# Patient Record
Sex: FEMALE | Race: WHITE | NOT HISPANIC OR LATINO | Employment: FULL TIME | URBAN - METROPOLITAN AREA
[De-identification: names, ages, dates, MRNs, and addresses within clinical notes are randomized per-mention and may not be internally consistent; named-entity substitution may affect disease eponyms.]

---

## 2017-10-11 ENCOUNTER — HOSPITAL ENCOUNTER (OUTPATIENT)
Dept: GENERAL RADIOLOGY | Facility: HOSPITAL | Age: 52
Discharge: HOME | End: 2017-10-11
Admitting: INTERNAL MEDICINE

## 2017-10-11 ENCOUNTER — TRANSCRIBE ORDERS (OUTPATIENT)
Dept: ADMINISTRATIVE | Facility: HOSPITAL | Age: 52
End: 2017-10-11

## 2017-10-11 DIAGNOSIS — J40 CATARRHAL BRONCHITIS: Primary | ICD-10-CM

## 2017-10-11 DIAGNOSIS — J40 CATARRHAL BRONCHITIS: ICD-10-CM

## 2017-10-11 PROCEDURE — 71020 HC CHEST PA AND LATERAL: CPT

## 2018-03-29 ENCOUNTER — TRANSCRIBE ORDERS (OUTPATIENT)
Dept: ADMINISTRATIVE | Facility: HOSPITAL | Age: 53
End: 2018-03-29

## 2018-03-29 ENCOUNTER — LAB (OUTPATIENT)
Dept: LAB | Facility: HOSPITAL | Age: 53
End: 2018-03-29

## 2018-03-29 DIAGNOSIS — E03.9 HYPOTHYROIDISM, UNSPECIFIED TYPE: ICD-10-CM

## 2018-03-29 DIAGNOSIS — D64.9 ANEMIA, UNSPECIFIED TYPE: ICD-10-CM

## 2018-03-29 DIAGNOSIS — D64.9 ANEMIA, UNSPECIFIED TYPE: Primary | ICD-10-CM

## 2018-03-29 LAB
ALBUMIN SERPL-MCNC: 4.4 G/DL (ref 3.5–5.2)
ALBUMIN/GLOB SERPL: 1.7 G/DL
ALP SERPL-CCNC: 52 U/L (ref 39–117)
ALT SERPL W P-5'-P-CCNC: 16 U/L (ref 1–33)
ANION GAP SERPL CALCULATED.3IONS-SCNC: 11.7 MMOL/L
AST SERPL-CCNC: 16 U/L (ref 1–32)
BASOPHILS # BLD AUTO: 0.04 10*3/MM3 (ref 0–0.2)
BASOPHILS NFR BLD AUTO: 0.6 % (ref 0–1.5)
BILIRUB SERPL-MCNC: 0.5 MG/DL (ref 0.1–1.2)
BUN BLD-MCNC: 9 MG/DL (ref 6–20)
BUN/CREAT SERPL: 12.2 (ref 7–25)
CALCIUM SPEC-SCNC: 9.5 MG/DL (ref 8.6–10.5)
CHLORIDE SERPL-SCNC: 99 MMOL/L (ref 98–107)
CO2 SERPL-SCNC: 27.3 MMOL/L (ref 22–29)
CREAT BLD-MCNC: 0.74 MG/DL (ref 0.57–1)
DEPRECATED RDW RBC AUTO: 42.2 FL (ref 37–54)
EOSINOPHIL # BLD AUTO: 0.11 10*3/MM3 (ref 0–0.7)
EOSINOPHIL NFR BLD AUTO: 1.6 % (ref 0.3–6.2)
ERYTHROCYTE [DISTWIDTH] IN BLOOD BY AUTOMATED COUNT: 12.6 % (ref 11.7–13)
FOLATE SERPL-MCNC: >20 NG/ML (ref 4.78–24.2)
GFR SERPL CREATININE-BSD FRML MDRD: 83 ML/MIN/1.73
GLOBULIN UR ELPH-MCNC: 2.6 GM/DL
GLUCOSE BLD-MCNC: 81 MG/DL (ref 65–99)
HCT VFR BLD AUTO: 42.7 % (ref 35.6–45.5)
HGB BLD-MCNC: 14.1 G/DL (ref 11.9–15.5)
IMM GRANULOCYTES # BLD: 0 10*3/MM3 (ref 0–0.03)
IMM GRANULOCYTES NFR BLD: 0 % (ref 0–0.5)
LYMPHOCYTES # BLD AUTO: 2.27 10*3/MM3 (ref 0.9–4.8)
LYMPHOCYTES NFR BLD AUTO: 32.7 % (ref 19.6–45.3)
MCH RBC QN AUTO: 30.5 PG (ref 26.9–32)
MCHC RBC AUTO-ENTMCNC: 33 G/DL (ref 32.4–36.3)
MCV RBC AUTO: 92.2 FL (ref 80.5–98.2)
MONOCYTES # BLD AUTO: 0.44 10*3/MM3 (ref 0.2–1.2)
MONOCYTES NFR BLD AUTO: 6.3 % (ref 5–12)
NEUTROPHILS # BLD AUTO: 4.08 10*3/MM3 (ref 1.9–8.1)
NEUTROPHILS NFR BLD AUTO: 58.8 % (ref 42.7–76)
PLATELET # BLD AUTO: 312 10*3/MM3 (ref 140–500)
PMV BLD AUTO: 9.6 FL (ref 6–12)
POTASSIUM BLD-SCNC: 4.3 MMOL/L (ref 3.5–5.2)
PROT SERPL-MCNC: 7 G/DL (ref 6–8.5)
RBC # BLD AUTO: 4.63 10*6/MM3 (ref 3.9–5.2)
SODIUM BLD-SCNC: 138 MMOL/L (ref 136–145)
T3 SERPL-MCNC: 98.6 NG/DL (ref 80–200)
T4 FREE SERPL-MCNC: 1.36 NG/DL (ref 0.93–1.7)
TSH SERPL DL<=0.05 MIU/L-ACNC: 1.01 MIU/ML (ref 0.27–4.2)
VIT B12 BLD-MCNC: 296 PG/ML (ref 211–946)
WBC NRBC COR # BLD: 6.94 10*3/MM3 (ref 4.5–10.7)

## 2018-03-29 PROCEDURE — 82746 ASSAY OF FOLIC ACID SERUM: CPT | Performed by: INTERNAL MEDICINE

## 2018-03-29 PROCEDURE — 36415 COLL VENOUS BLD VENIPUNCTURE: CPT

## 2018-03-29 PROCEDURE — 85025 COMPLETE CBC W/AUTO DIFF WBC: CPT | Performed by: INTERNAL MEDICINE

## 2018-03-29 PROCEDURE — 84443 ASSAY THYROID STIM HORMONE: CPT | Performed by: INTERNAL MEDICINE

## 2018-03-29 PROCEDURE — 84480 ASSAY TRIIODOTHYRONINE (T3): CPT | Performed by: INTERNAL MEDICINE

## 2018-03-29 PROCEDURE — 82607 VITAMIN B-12: CPT | Performed by: INTERNAL MEDICINE

## 2018-03-29 PROCEDURE — 84439 ASSAY OF FREE THYROXINE: CPT | Performed by: INTERNAL MEDICINE

## 2018-03-29 PROCEDURE — 80053 COMPREHEN METABOLIC PANEL: CPT | Performed by: INTERNAL MEDICINE

## 2018-08-27 ENCOUNTER — TRANSCRIBE ORDERS (OUTPATIENT)
Dept: ADMINISTRATIVE | Facility: HOSPITAL | Age: 53
End: 2018-08-27

## 2018-08-27 DIAGNOSIS — N02.9 IDIOPATHIC HEMATURIA, UNSPECIFIED WHETHER GLOMERULAR MORPHOLOGIC CHANGES PRESENT: Primary | ICD-10-CM

## 2018-08-27 DIAGNOSIS — R31.9 HEMATURIA, UNSPECIFIED TYPE: Primary | ICD-10-CM

## 2018-08-29 ENCOUNTER — HOSPITAL ENCOUNTER (OUTPATIENT)
Dept: CT IMAGING | Facility: HOSPITAL | Age: 53
Discharge: HOME | End: 2018-08-29
Admitting: INTERNAL MEDICINE

## 2018-08-29 DIAGNOSIS — N02.9 IDIOPATHIC HEMATURIA, UNSPECIFIED WHETHER GLOMERULAR MORPHOLOGIC CHANGES PRESENT: ICD-10-CM

## 2018-08-29 PROCEDURE — 74176 CT ABD & PELVIS W/O CONTRAST: CPT

## 2018-11-19 ENCOUNTER — APPOINTMENT (OUTPATIENT)
Dept: LAB | Facility: HOSPITAL | Age: 53
End: 2018-11-19

## 2018-11-19 ENCOUNTER — OFFICE VISIT (OUTPATIENT)
Dept: NEUROLOGY | Facility: CLINIC | Age: 53
End: 2018-11-19

## 2018-11-19 VITALS
SYSTOLIC BLOOD PRESSURE: 98 MMHG | HEART RATE: 100 BPM | WEIGHT: 110 LBS | OXYGEN SATURATION: 98 % | BODY MASS INDEX: 18.33 KG/M2 | HEIGHT: 65 IN | DIASTOLIC BLOOD PRESSURE: 72 MMHG

## 2018-11-19 DIAGNOSIS — R42 DIZZINESS: Primary | ICD-10-CM

## 2018-11-19 LAB
FOLATE SERPL-MCNC: >20 NG/ML (ref 4.78–24.2)
VIT B12 BLD-MCNC: 524 PG/ML (ref 211–946)

## 2018-11-19 PROCEDURE — 82607 VITAMIN B-12: CPT | Performed by: PSYCHIATRY & NEUROLOGY

## 2018-11-19 PROCEDURE — 36415 COLL VENOUS BLD VENIPUNCTURE: CPT | Performed by: PSYCHIATRY & NEUROLOGY

## 2018-11-19 PROCEDURE — 82746 ASSAY OF FOLIC ACID SERUM: CPT | Performed by: PSYCHIATRY & NEUROLOGY

## 2018-11-19 PROCEDURE — 99244 OFF/OP CNSLTJ NEW/EST MOD 40: CPT | Performed by: PSYCHIATRY & NEUROLOGY

## 2018-11-19 NOTE — PROGRESS NOTES
"Valeria Lemos is a 53 y.o. female presents for   Chief Complaint   Patient presents with   • Dizziness                CHIEF COMPLAINT:  Neurology consultation requested by Yg Victor MD for dizziness    History of Present Illness  A 53 FRED, right-handed,  with [employer] for 22 years.  She is referred with the chief complaint of dizziness  From the start she was not a very cooperative patient: she raised her voice when I suggested that she first answers the questions and then I would allow her all the time to tell her own story: she utterly refused and insisted on reading from a written statement. I asked if I could have her written history and she allowed me to make a copy for the record. I have outlined her written history for the record.  After she read her written statement she answered my questions but the answers were not for the most part  with any \"authority\" .   She told me she has applied for disability that she has neither workers' comp not legal pending.    Her story started with nausea and vomiting back in June 2017  AS could be ready in her written history she has had multiple visits to ED in multiple cities which she said disrupted flight schedules and at times they had [facility] had to make emergency landings to have ehr taken to ED.    After circling around with the history I read the notes from her PCP she then started answering questions  She said her major reason for seeing me was the \"dizziness\". I read in her PCP's notes that she has had some headaches but these are not of concern to her as \"they are far and few in-between\".    The dizziness is light-headedness without any vertigo. 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 She ahs no clearly associated neurological symptoms with the dizziness but thinks ehr memory is going downhill,.    She was seen by ENT and based on her description she ahd a " "full work up that was nor remarkable for any abnormalities; She then requested ENT refers her to neurology but declined to do so.      Basically her story is as follows for the dizziness:  Onset 1 month ago for no precipitating reason BUT she has been having manipulations by Chiropractor (see ehr written history)  It si off/on, movement related without vertigo tinnitus or hearing changes  During the episodes that last few minutes \"I have to stop and focus and look straight ahead\" without diplopia or visual changes . No oscillopsia  Frequency \"almost daily\" \"once or twice\"  Duration \"not too long few minutes\"  Triggers: she is fixated on any movement on the airplane....yet the onset of dizziness was one month ago and she last worked on September 18, 2018  Overall no progression since onset and no new related symptoms          The following portions of the patient's history were reviewed and updated as appropriate: allergies, current medications, past family history, past medical history, past social history, past surgical history and problem list.        Review of Systems   Constitutional: Positive for diaphoresis, fatigue and unexpected weight change.   HENT: Negative for hearing loss, tinnitus and trouble swallowing.    Eyes: Negative for pain, redness and itching.   Respiratory: Negative for cough, shortness of breath and wheezing.    Cardiovascular: Negative for chest pain, palpitations and leg swelling.   Gastrointestinal: Positive for nausea and vomiting. Negative for constipation and diarrhea.   Endocrine: Positive for polydipsia. Negative for cold intolerance and heat intolerance.   Genitourinary: Negative for difficulty urinating, frequency and urgency.   Musculoskeletal: Negative for back pain, gait problem, neck pain and neck stiffness.   Skin: Negative for color change and wound.   Allergic/Immunologic: Negative for environmental allergies, food allergies and immunocompromised state.   Neurological: " Positive for dizziness, weakness, light-headedness and headaches. Negative for tremors, seizures, syncope, facial asymmetry, speech difficulty and numbness.   Hematological: Negative for adenopathy. Does not bruise/bleed easily.   Psychiatric/Behavioral: Negative for agitation, behavioral problems, confusion, decreased concentration, dysphoric mood, hallucinations, self-injury, sleep disturbance and suicidal ideas. The patient is not nervous/anxious and is not hyperactive.          Past Medical History:   Diagnosis Date   • Arthritis          Social History     Socioeconomic History   • Marital status:      Spouse name: Not on file   • Number of children: Not on file   • Years of education: Not on file   • Highest education level: Not on file   Social Needs   • Financial resource strain: Not on file   • Food insecurity - worry: Not on file   • Food insecurity - inability: Not on file   • Transportation needs - medical: Not on file   • Transportation needs - non-medical: Not on file   Occupational History   • Not on file   Tobacco Use   • Smoking status: Never Smoker   • Smokeless tobacco: Never Used   Substance and Sexual Activity   • Alcohol use: Yes   • Drug use: No   • Sexual activity: Defer   Other Topics Concern   • Not on file   Social History Narrative   • Not on file          Family History   Problem Relation Age of Onset   • No Known Problems Mother    • Heart disease Father    • Kidney disease Father    • Cancer Brother    • No Known Problems Maternal Grandmother    • No Known Problems Maternal Grandfather    • Arthritis Paternal Grandmother    • No Known Problems Paternal Grandfather          No current outpatient medications on file.      Vitals:    11/19/18 1541   BP: 98/72   Pulse: 100   SpO2: 98%         Physical Exam   Constitutional: She is oriented to person, place, and time. She appears well-developed and well-nourished. She appears distressed.   HENT:   Head: Normocephalic.   Mouth/Throat:  Oropharynx is clear and moist.   Cardiovascular: Normal rate, regular rhythm and intact distal pulses.   Pulmonary/Chest: Effort normal. No respiratory distress.   Neurological: She is oriented to person, place, and time. She has normal strength. She has an abnormal Romberg Test. She has a normal Finger-Nose-Finger Test, a normal Heel to Seo Test and a normal Tandem Gait Test. Gait normal.   Reflex Scores:       Tricep reflexes are 2+ on the right side and 2+ on the left side.       Bicep reflexes are 2+ on the right side and 2+ on the left side.       Brachioradialis reflexes are 2+ on the right side and 2+ on the left side.       Patellar reflexes are 2+ on the right side and 2+ on the left side.       Achilles reflexes are 2+ on the right side and 2+ on the left side.  Skin: She is diaphoretic.   Psychiatric: Her speech is normal.   Argumentive     Clearly I could not trigger her dizziness despite asking her to move and change positions quickly during the exam    Neurologic Exam     Mental Status   Oriented to person, place, and time.   Registration: recalls 3 of 3 objects. Recall at 5 minutes: recalls 1 of 3 objects. Follows 3 step commands.   Attention: normal. Concentration: normal.   Speech: speech is normal   Level of consciousness: alert  Knowledge: consistent with education. Able to perform simple calculations.   Able to read. Able to write. Normal comprehension.     Cranial Nerves     CN II   Visual fields full to confrontation.     CN III, IV, VI   Right pupil: Size: 4 mm. Shape: regular. Reactivity: brisk. Consensual response: intact. Accommodation: intact.   Left pupil: Size: 4 mm. Shape: regular. Reactivity: brisk. Consensual response: intact. Accommodation: intact.   CN III: no CN III palsy  CN VI: no CN VI palsy  Nystagmus: none   Diplopia: none  Ophthalmoparesis: none  Upgaze: normal  Downgaze: normal  Conjugate gaze: present    CN V   Facial sensation intact.     CN VII   Facial expression full,  symmetric.     CN VIII   CN VIII normal.   Hearing impaired: normal to normal conversations.    CN IX, X   CN IX normal.     CN XI   CN XI normal.     CN XII   CN XII normal.     Motor Exam   Muscle bulk: normal  Overall muscle tone: normal  Right arm tone: normal  Left arm tone: normal  Right arm pronator drift: absent  Left arm pronator drift: absent  Right leg tone: normal  Left leg tone: normal    Strength   Strength 5/5 throughout.     Sensory Exam   Light touch normal.   Vibration normal.   Pinprick normal.   Graphesthesia: normal  Stereognosis: normal    Gait, Coordination, and Reflexes     Gait  Gait: normal    Coordination   Romberg: positive  Finger to nose coordination: normal  Heel to shin coordination: normal  Tandem walking coordination: normal    Tremor   Resting tremor: absent  Intention tremor: absent  Action tremor: absent    Reflexes   Right brachioradialis: 2+  Left brachioradialis: 2+  Right biceps: 2+  Left biceps: 2+  Right triceps: 2+  Left triceps: 2+  Right patellar: 2+  Left patellar: 2+  Right achilles: 2+  Left achilles: 2+  Right : 2+  Left : 2+  Right plantar: normal  Left plantar: normal  Right Velásquez: absent  Left Velásquez: absent  Right ankle clonus: absent  Left ankle clonus: absent  Right pendular knee jerk: absent  Left pendular knee jerk: absent          No visits with results within 2 Month(s) from this visit.   Latest known visit with results is:   Lab on 03/29/2018   Component Date Value Ref Range Status   • Glucose 03/29/2018 81  65 - 99 mg/dL Final   • BUN 03/29/2018 9  6 - 20 mg/dL Final   • Creatinine 03/29/2018 0.74  0.57 - 1.00 mg/dL Final   • Sodium 03/29/2018 138  136 - 145 mmol/L Final   • Potassium 03/29/2018 4.3  3.5 - 5.2 mmol/L Final   • Chloride 03/29/2018 99  98 - 107 mmol/L Final   • CO2 03/29/2018 27.3  22.0 - 29.0 mmol/L Final   • Calcium 03/29/2018 9.5  8.6 - 10.5 mg/dL Final   • Total Protein 03/29/2018 7.0  6.0 - 8.5 g/dL Final   • Albumin  03/29/2018 4.40  3.50 - 5.20 g/dL Final   • ALT (SGPT) 03/29/2018 16  1 - 33 U/L Final   • AST (SGOT) 03/29/2018 16  1 - 32 U/L Final   • Alkaline Phosphatase 03/29/2018 52  39 - 117 U/L Final   • Total Bilirubin 03/29/2018 0.5  0.1 - 1.2 mg/dL Final   • eGFR Non African Amer 03/29/2018 83  >60 mL/min/1.73 Final   • Globulin 03/29/2018 2.6  gm/dL Final   • A/G Ratio 03/29/2018 1.7  g/dL Final   • BUN/Creatinine Ratio 03/29/2018 12.2  7.0 - 25.0 Final   • Anion Gap 03/29/2018 11.7  mmol/L Final   • Vitamin B-12 03/29/2018 296  211 - 946 pg/mL Final   • Folate 03/29/2018 >20.00  4.78 - 24.20 ng/mL Final   • T3, Total 03/29/2018 98.6  80.0 - 200.0 ng/dl Final   • Free T4 03/29/2018 1.36  0.93 - 1.70 ng/dL Final   • TSH 03/29/2018 1.010  0.270 - 4.200 mIU/mL Final   • WBC 03/29/2018 6.94  4.50 - 10.70 10*3/mm3 Final   • RBC 03/29/2018 4.63  3.90 - 5.20 10*6/mm3 Final   • Hemoglobin 03/29/2018 14.1  11.9 - 15.5 g/dL Final   • Hematocrit 03/29/2018 42.7  35.6 - 45.5 % Final   • MCV 03/29/2018 92.2  80.5 - 98.2 fL Final   • MCH 03/29/2018 30.5  26.9 - 32.0 pg Final   • MCHC 03/29/2018 33.0  32.4 - 36.3 g/dL Final   • RDW 03/29/2018 12.6  11.7 - 13.0 % Final   • RDW-SD 03/29/2018 42.2  37.0 - 54.0 fl Final   • MPV 03/29/2018 9.6  6.0 - 12.0 fL Final   • Platelets 03/29/2018 312  140 - 500 10*3/mm3 Final   • Neutrophil % 03/29/2018 58.8  42.7 - 76.0 % Final   • Lymphocyte % 03/29/2018 32.7  19.6 - 45.3 % Final   • Monocyte % 03/29/2018 6.3  5.0 - 12.0 % Final   • Eosinophil % 03/29/2018 1.6  0.3 - 6.2 % Final   • Basophil % 03/29/2018 0.6  0.0 - 1.5 % Final   • Immature Grans % 03/29/2018 0.0  0.0 - 0.5 % Final   • Neutrophils, Absolute 03/29/2018 4.08  1.90 - 8.10 10*3/mm3 Final   • Lymphocytes, Absolute 03/29/2018 2.27  0.90 - 4.80 10*3/mm3 Final   • Monocytes, Absolute 03/29/2018 0.44  0.20 - 1.20 10*3/mm3 Final   • Eosinophils, Absolute 03/29/2018 0.11  0.00 - 0.70 10*3/mm3 Final   • Basophils, Absolute 03/29/2018 0.04   0.00 - 0.20 10*3/mm3 Final   • Immature Grans, Absolute 03/29/2018 0.00  0.00 - 0.03 10*3/mm3 Final            REVIEW OF STUDIES:      DIAGNOSIS, IMPRESSION AND PLAN:  Nonscript dizziness with functional positive Romberg   I could not tell from her story if the dizziness onset had time relationship to chiropractor manipulation.  She does not think the headaches are of any concern and basically brushed off any details about them.  Her recall is of questionable reliability but I will investigate with   B12/folate and brain MRI.  I doubt she has developed vertebral artery dissection duhring chiropractor  I reviewed DDx and the need for the workup.

## 2018-12-03 ENCOUNTER — HOSPITAL ENCOUNTER (OUTPATIENT)
Dept: MRI IMAGING | Facility: HOSPITAL | Age: 53
Discharge: HOME | End: 2018-12-03
Attending: PSYCHIATRY & NEUROLOGY

## 2018-12-03 DIAGNOSIS — R42 DIZZINESS: ICD-10-CM

## 2018-12-03 PROCEDURE — 82565 ASSAY OF CREATININE: CPT

## 2018-12-04 ENCOUNTER — TELEPHONE (OUTPATIENT)
Dept: NEUROLOGY | Facility: CLINIC | Age: 53
End: 2018-12-04

## 2018-12-04 LAB — CREAT BLDA-MCNC: 0.8 MG/DL (ref 0.6–1.3)

## 2018-12-04 NOTE — TELEPHONE ENCOUNTER
----- Message from Sujatha Clifton sent at 12/4/2018 11:38 AM EST -----  Contact: Patient  Pt was unable to have MRI yesterday due to claustrophobia.  She would like to have a new order put in and prescribe Valium for her nerves.      Send RX to [facility].     Call pt to let her know rx has been sent.

## 2018-12-05 DIAGNOSIS — R42 DIZZINESS: Primary | ICD-10-CM

## 2018-12-05 DIAGNOSIS — R42 DIZZINESSES: ICD-10-CM

## 2018-12-06 PROBLEM — R42 DIZZINESS: Status: ACTIVE | Noted: 2018-12-06

## 2018-12-19 ENCOUNTER — OFFICE VISIT (OUTPATIENT)
Dept: NEUROLOGY | Facility: CLINIC | Age: 53
End: 2018-12-19

## 2018-12-19 VITALS
OXYGEN SATURATION: 97 % | HEIGHT: 65 IN | HEART RATE: 68 BPM | SYSTOLIC BLOOD PRESSURE: 118 MMHG | WEIGHT: 113 LBS | DIASTOLIC BLOOD PRESSURE: 68 MMHG | BODY MASS INDEX: 18.83 KG/M2

## 2018-12-19 DIAGNOSIS — R42 DIZZINESS: Primary | ICD-10-CM

## 2018-12-19 PROCEDURE — 99213 OFFICE O/P EST LOW 20 MIN: CPT | Performed by: PSYCHIATRY & NEUROLOGY

## 2018-12-19 RX ORDER — ESTRADIOL/NORETHINDRONE ACETATE TRANSDERMAL SYSTEM .05; .14 MG/D; MG/D
PATCH, EXTENDED RELEASE TRANSDERMAL SEE ADMIN INSTRUCTIONS
Refills: 3 | COMMUNITY
Start: 2018-12-12 | End: 2019-10-25

## 2018-12-19 RX ORDER — OMEPRAZOLE 40 MG/1
CAPSULE, DELAYED RELEASE ORAL
COMMUNITY
Start: 2018-12-01 | End: 2019-02-27

## 2018-12-19 NOTE — PROGRESS NOTES
"Follow Up Visit:  Valeria is seen in follow up today regarding her dizziness and headcahes  At initial consultation her major concern as a  was her dizziness but she was not concerend about the headcahes    Interim review she was seen by Gyn and was satrted on hormonal therapy and her sympotms have essentially resolved  NO new complaints            Vitals:    12/19/18 1147   BP: 118/68   Pulse: 68   SpO2: 97%   Weight: 51.3 kg (113 lb)   Height: 165.1 cm (65\")       Outside MRI IAC was done at [facility]  WE have the repot but not the CD: she chose to go there althopugh I gave her sedation so she could have it done here at [facility]  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 I showed her using colorful anatomical diagrams where the telangiectasia is        IMPRESSION / PLAN  Symptoms of headaches and dizziness resolved on compipatch (hormonal)      "

## 2019-02-15 ENCOUNTER — TELEPHONE (OUTPATIENT)
Dept: FAMILY MEDICINE CLINIC | Facility: CLINIC | Age: 54
End: 2019-02-15

## 2019-02-15 NOTE — TELEPHONE ENCOUNTER
LEFT DETAILED MESSAGE     ----- Message from Mello Briceño MD sent at 2/15/2019  3:08 PM EST -----    Agree to see patient    ----- Message -----  From: Sonya Cifuentes  Sent: 2/15/2019   9:35 AM  To: Mello Briceño MD    Spouse of aki martinez, current patient     She would like to be your patient,  experiencing ongoing nausea and diahrea when flying. Do you want to see her as a patient?     [phone]

## 2019-02-27 ENCOUNTER — TELEPHONE (OUTPATIENT)
Dept: FAMILY MEDICINE CLINIC | Facility: CLINIC | Age: 54
End: 2019-02-27

## 2019-02-27 ENCOUNTER — OFFICE VISIT (OUTPATIENT)
Dept: FAMILY MEDICINE CLINIC | Facility: CLINIC | Age: 54
End: 2019-02-27

## 2019-02-27 VITALS
TEMPERATURE: 98.1 F | SYSTOLIC BLOOD PRESSURE: 104 MMHG | BODY MASS INDEX: 18.73 KG/M2 | HEIGHT: 65 IN | OXYGEN SATURATION: 99 % | WEIGHT: 112.4 LBS | HEART RATE: 74 BPM | DIASTOLIC BLOOD PRESSURE: 70 MMHG

## 2019-02-27 DIAGNOSIS — R53.83 FATIGUE, UNSPECIFIED TYPE: ICD-10-CM

## 2019-02-27 DIAGNOSIS — R11.2 NON-INTRACTABLE VOMITING WITH NAUSEA, UNSPECIFIED VOMITING TYPE: Primary | ICD-10-CM

## 2019-02-27 PROCEDURE — 99203 OFFICE O/P NEW LOW 30 MIN: CPT | Performed by: INTERNAL MEDICINE

## 2019-02-27 NOTE — TELEPHONE ENCOUNTER
LEFT DETAILED MESSAGE FOR PATIENT ON VM     ----- Message from Domingo Flores MD sent at 2/27/2019  2:54 PM EST -----    I said I wanted to see what the lab work looked like first    ----- Message -----    From: Sonya Cifuentes  Sent: 2/27/2019   2:16 PM  To: Domingo Flores MD    You had discussed calling in a mild anti-depressant for the patient during her appt today, nothing noted in the med section. Please send to [facility] in chart

## 2019-02-27 NOTE — PROGRESS NOTES
Subjective Complaint is to establish care but also nausea and vomiting and fatigue  Valeria Lemos is a 53 y.o. female.     History of Present Illness   Valeria is here today to establish care.  She has had the onset of intermittent nausea and vomiting beginning in 2017 after a plane flight.  Initially it was thought that she had some sort of food poisoning.  Since that time she has had numerous other episodes of this.  She works as a .  This usually seems to occur either during a flight or sometimes 12 hours after a flight.  Has seen an ear nose and throat doctor.  Her electronystagmogram was basically okay.  Her Cameron-Hallpike maneuvers did not show any evidence of vertigo.  She has seen a neurologist.  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  He has seen a gastroenterologist.  She has had a normal EGD and colonoscopy.  Ultrasound of the gallbladder and HIDA scan were normal.  Her CT scan of the abdomen and pelvis looked okay.  She is under some stress dealing with a 14-year-old daughter.  She has not been able to work since September.  She has had numerous flights have to be cut short because of her symptoms.  The following portions of the patient's history were reviewed and updated as appropriate: allergies, current medications, past medical history, past social history, past surgical history and problem list.    Review of Systems   Constitutional: Positive for fatigue. Negative for unexpected weight gain and unexpected weight loss.   Gastrointestinal: Positive for nausea and vomiting.   Neurological: Positive for dizziness.   Psychiatric/Behavioral: Positive for dysphoric mood and sleep disturbance.       Objective   Physical Exam   Constitutional: She is oriented to person, place, and time. She appears well-developed and well-nourished.   HENT:   Tympanic membranes are normal.  There is no significant  nasal congestion.  Rhinorrhea is clear.  Oropharynx is clear and the palate elevates symmetrically.   Eyes: Conjunctivae are normal.   Neck: No thyromegaly present.   Cardiovascular: Normal rate, regular rhythm, normal heart sounds and intact distal pulses. Exam reveals no gallop and no friction rub.   No murmur heard.  Pulmonary/Chest: Effort normal and breath sounds normal. No stridor. No respiratory distress. She has no wheezes. She has no rales.   Abdominal: Soft. Bowel sounds are normal. She exhibits no distension and no mass. There is no tenderness. There is no guarding.   Musculoskeletal: She exhibits no edema.   Lymphadenopathy:     She has no cervical adenopathy.   Neurological: She is alert and oriented to person, place, and time. No cranial nerve deficit.   Skin: Skin is warm and dry.   Psychiatric: She has a normal mood and affect.   Nursing note and vitals reviewed.        Assessment/Plan   Valeria was seen today for establish care, vomiting and nausea.    Diagnoses and all orders for this visit:    Non-intractable vomiting with nausea, unspecified vomiting type  -     TSH+Free T4  -     T3, Free  -     Cortisol  -     Sedimentation Rate  -     C-reactive Protein  -     KARLY Comprehensive Panel  -     Rheumatoid Factor  -     Charlene-Barr Virus VCA Antibody Panel    Fatigue, unspecified type  -     TSH+Free T4  -     T3, Free  -     Cortisol  -     Sedimentation Rate  -     C-reactive Protein  -     KARLY Comprehensive Panel  -     Rheumatoid Factor  -     Charlene-Barr Virus VCA Antibody Panel    Valeria is here today for establishment of care.  I am going to check some laboratories for her fatigue.  I do not think I will be able to solve the problem of nausea and vomiting.  It may be something related to altitude or cabin pressure sickness.  She did fill out a depression questionnaire and scored quite high on this.  If her lab work today comes back normal we may consider low-dose Remeron.

## 2019-02-28 DIAGNOSIS — R42 DIZZINESS: Primary | ICD-10-CM

## 2019-02-28 LAB
CENTROMERE B AB SER-ACNC: <0.2 AI (ref 0–0.9)
CHROMATIN AB SERPL-ACNC: <0.2 AI (ref 0–0.9)
CORTIS SERPL-MCNC: 8 UG/DL
CRP SERPL-MCNC: 0.07 MG/DL (ref 0–0.5)
DSDNA AB SER-ACNC: <1 IU/ML (ref 0–9)
EBV EA IGG SER-ACNC: 31.4 U/ML (ref 0–8.9)
EBV NA IGG SER IA-ACNC: 421 U/ML (ref 0–17.9)
EBV VCA IGG SER IA-ACNC: >600 U/ML (ref 0–17.9)
EBV VCA IGM SER IA-ACNC: <36 U/ML (ref 0–35.9)
ENA JO1 AB SER-ACNC: <0.2 AI (ref 0–0.9)
ENA RNP AB SER-ACNC: 0.3 AI (ref 0–0.9)
ENA SCL70 AB SER-ACNC: <0.2 AI (ref 0–0.9)
ENA SM AB SER-ACNC: <0.2 AI (ref 0–0.9)
ENA SS-A AB SER-ACNC: <0.2 AI (ref 0–0.9)
ENA SS-B AB SER-ACNC: <0.2 AI (ref 0–0.9)
ERYTHROCYTE [SEDIMENTATION RATE] IN BLOOD BY WESTERGREN METHOD: 4 MM/HR (ref 0–30)
Lab: NORMAL
RHEUMATOID FACT SERPL-ACNC: <10 IU/ML (ref 0–13.9)
SERVICE CMNT-IMP: ABNORMAL
T3FREE SERPL-MCNC: 3.3 PG/ML (ref 2–4.4)
T4 FREE SERPL-MCNC: 1.17 NG/DL (ref 0.93–1.7)
TSH SERPL DL<=0.005 MIU/L-ACNC: 2.16 MIU/ML (ref 0.27–4.2)

## 2019-02-28 RX ORDER — MIRTAZAPINE 7.5 MG/1
7.5 TABLET, FILM COATED ORAL NIGHTLY
Qty: 30 TABLET | Refills: 2 | Status: SHIPPED | OUTPATIENT
Start: 2019-02-28 | End: 2019-03-25 | Stop reason: SDUPTHER

## 2019-03-01 ENCOUNTER — TELEPHONE (OUTPATIENT)
Dept: FAMILY MEDICINE CLINIC | Facility: CLINIC | Age: 54
End: 2019-03-01

## 2019-03-01 NOTE — TELEPHONE ENCOUNTER
Spoke to Dr Victor office and they need a signed release form.  Called Valeria and she will stop in next time she is in the area to get one so we can fax it over.  Phone [phone]    ----- Message from Domingo Flores MD sent at 2/27/2019 10:24 AM EST -----  Please contact dr gloria victor for records

## 2019-03-25 RX ORDER — MIRTAZAPINE 7.5 MG/1
7.5 TABLET, FILM COATED ORAL NIGHTLY
Qty: 30 TABLET | Refills: 2 | Status: SHIPPED | OUTPATIENT
Start: 2019-03-25 | End: 2021-07-08

## 2019-10-25 ENCOUNTER — OFFICE VISIT (OUTPATIENT)
Dept: FAMILY MEDICINE CLINIC | Facility: CLINIC | Age: 54
End: 2019-10-25

## 2019-10-25 VITALS
SYSTOLIC BLOOD PRESSURE: 120 MMHG | BODY MASS INDEX: 18.09 KG/M2 | HEART RATE: 77 BPM | WEIGHT: 108.6 LBS | TEMPERATURE: 97.8 F | OXYGEN SATURATION: 99 % | DIASTOLIC BLOOD PRESSURE: 82 MMHG | HEIGHT: 65 IN

## 2019-10-25 DIAGNOSIS — G24.01 TARDIVE DYSKINESIA: Primary | ICD-10-CM

## 2019-10-25 DIAGNOSIS — Z23 NEED FOR IMMUNIZATION AGAINST INFLUENZA: ICD-10-CM

## 2019-10-25 PROCEDURE — 90471 IMMUNIZATION ADMIN: CPT | Performed by: INTERNAL MEDICINE

## 2019-10-25 PROCEDURE — 90674 CCIIV4 VAC NO PRSV 0.5 ML IM: CPT | Performed by: INTERNAL MEDICINE

## 2019-10-25 PROCEDURE — 99213 OFFICE O/P EST LOW 20 MIN: CPT | Performed by: INTERNAL MEDICINE

## 2019-10-25 RX ORDER — ESTRADIOL AND LEVONORGESTREL .045; .015 MG/D; MG/D
PATCH TRANSDERMAL
Refills: 2 | COMMUNITY
Start: 2019-10-11 | End: 2021-10-08

## 2019-10-25 NOTE — PROGRESS NOTES
Subjective Pain is twitching  Valeria Lemos is a 54 y.o. female.     History of Present Illness   Valeria is here today for twitching.  This is been going on for several months.  She reports that it is in her jaw and neck and left arm.  Is gotten worse over the last several months.  She was treated with multiple medicines for nausea in the past.  I suspect 1 of these may be the underlying cause.  I did review a prior MRI scan [facility].  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      Review of Systems   HENT: Positive for ear pain.        Objective   Physical Exam   Constitutional: She is oriented to person, place, and time. She appears well-developed and well-nourished.   Cardiovascular: Normal rate.   Pulmonary/Chest: Effort normal.   Neurological: She is alert and oriented to person, place, and time. No cranial nerve deficit.   She has some facial grimacing.  She has involuntary movements in her left trapezius.  There are no fasciculations of the tongue.   Nursing note and vitals reviewed.        Assessment/Plan   Valeria was seen today for follow-up.    Diagnoses and all orders for this visit:    Tardive dyskinesia  -     Ambulatory Referral to Neurology      Valeria is here today for evaluation of abnormal movements which I believe to be a tar dive dyskinesia.  I am going to refer her to the [facility] movement disorder clinic.

## 2020-02-10 ENCOUNTER — OFFICE VISIT (OUTPATIENT)
Dept: NEUROLOGY | Facility: CLINIC | Age: 55
End: 2020-02-10

## 2020-02-10 VITALS
WEIGHT: 109 LBS | HEART RATE: 76 BPM | BODY MASS INDEX: 18.16 KG/M2 | HEIGHT: 65 IN | OXYGEN SATURATION: 98 % | SYSTOLIC BLOOD PRESSURE: 110 MMHG | DIASTOLIC BLOOD PRESSURE: 72 MMHG

## 2020-02-10 DIAGNOSIS — G24.01 TARDIVE DYSKINESIA: Primary | ICD-10-CM

## 2020-02-10 PROCEDURE — 99214 OFFICE O/P EST MOD 30 MIN: CPT | Performed by: PSYCHIATRY & NEUROLOGY

## 2020-02-10 NOTE — PROGRESS NOTES
Chief Complaint   Patient presents with   • Tardive Dyskinesia       Patient ID: Valeria Lemos is a 54 y.o. female.    HPI: I had the pleasure of seeing your patient today.  As you may know Valeria is a 54-year-old female who had been seen previously by my former colleague here at [facility] neurology.  A chart review was performed today seeing how the patient is new to me.  This is my first time seeing her.  She was being seen previously for dizziness.  Her symptoms of dizziness have essentially resolved and she has been doing better however she states that she has a new symptom.  She describes it as movements of her mouth particularly the jaw and tongue.  She states that she will have involuntary movements of her left upper extremity.  The patient has a lot of GI issues and has been treated with several medications off and on for this.  She says that she was told that 1 of the medications has caused these presenting symptoms as a side effect.  She is currently not on any medications for the gastritis.  She cannot recall the medications she had been given however she says that she has been to the emergency room several times and was given several doses of anti-nausea medication.  She was also taking Zofran for quite some time as needed.  She states that her current symptoms come and go in severity.  She has noted that when she is fatigued or tired her symptoms may be more prominent.  She denies any significant quality of life changes related to these symptoms.  She has not had any focal weakness or numbness.  No double vision.  No slurred speech or trouble swallowing.  No balance or gait changes.    The following portions of the patient's history were reviewed and updated as appropriate: allergies, current medications, past family history, past medical history, past social history, past surgical history and problem list.    Review of Systems   Constitutional: Negative for activity change, appetite change and  fatigue.   HENT: Negative for facial swelling, hearing loss and tinnitus.    Eyes: Negative for photophobia, redness and visual disturbance.   Respiratory: Negative for chest tightness, shortness of breath and wheezing.    Cardiovascular: Negative for chest pain, palpitations and leg swelling.   Gastrointestinal: Negative for abdominal pain, nausea and vomiting.   Endocrine: Negative for cold intolerance, heat intolerance and polydipsia.   Musculoskeletal: Negative for back pain, gait problem and neck pain.   Skin: Negative for color change, rash and wound.   Allergic/Immunologic: Negative for environmental allergies, food allergies and immunocompromised state.   Neurological: Negative for dizziness, tremors, seizures, syncope, facial asymmetry, speech difficulty, weakness, light-headedness, numbness and headaches.   Hematological: Negative for adenopathy. Does not bruise/bleed easily.   Psychiatric/Behavioral: Negative for agitation, behavioral problems, confusion, decreased concentration, dysphoric mood, hallucinations, self-injury, sleep disturbance and suicidal ideas. The patient is not nervous/anxious and is not hyperactive.       I reviewed and agree with the above review of systems completed by the medical assistant.    Vitals:    02/10/20 0812   Pulse: 76   SpO2: 98%       Neurologic Exam     Mental Status   Oriented to person, place, and time.   Concentration: normal.   Level of consciousness: alert  Knowledge: consistent with education (No deficits found.).     Cranial Nerves     CN II   Visual fields full to confrontation.     CN III, IV, VI   Pupils are equal, round, and reactive to light.  Extraocular motions are normal.   CN III: no CN III palsy  CN VI: no CN VI palsy    CN V   Facial sensation intact.     CN VII   Facial expression full, symmetric.     CN VIII   CN VIII normal.     CN IX, X   CN IX normal.   CN X normal.     CN XI   CN XI normal.     CN XII   CN XII normal.     Motor Exam     Strength    Right neck flexion: 5/5  Left neck flexion: 5/5  Right neck extension: 5/5  Left neck extension: 5/5  Right deltoid: 5/5  Left deltoid: 5/5  Right biceps: 5/5  Left biceps: 5/5  Right triceps: 5/5  Left triceps: 5/5  Right wrist flexion: 5/5  Left wrist flexion: 5/5  Right wrist extension: 5/5  Left wrist extension: 5/5  Right interossei: 5/5  Left interossei: 5/5  Right abdominals: 5/5  Left abdominals: 5/5  Right iliopsoas: 5/5  Left iliopsoas: 5/5  Right quadriceps: 5/5  Left quadriceps: 5/5  Right hamstrin/5  Left hamstrin/5  Right glutei: 5/5  Left glutei: 5/5  Right anterior tibial: 5/5  Left anterior tibial: 5/5  Right posterior tibial: 5/5  Left posterior tibial: 5/5  Right peroneal: 5/5  Left peroneal: 5/5  Right gastroc: 5/5  Left gastroc: 5/5Occasional twitching of the left shoulder and jaw.  Particularly the corners of the mouth more prominent on the left.     Sensory Exam   Light touch normal.   Vibration normal.     Gait, Coordination, and Reflexes     Gait  Gait: normal    Reflexes   Right brachioradialis: 2+  Left brachioradialis: 2+  Right biceps: 2+  Left biceps: 2+  Right triceps: 2+  Left triceps: 2+  Right patellar: 2+  Left patellar: 2+  Right achilles: 2+  Left achilles: 2+  Right : 2+  Left : 2+Station is normal.       Physical Exam   Constitutional: She is oriented to person, place, and time. She appears well-developed and well-nourished.   HENT:   Head: Normocephalic and atraumatic.   Eyes: Pupils are equal, round, and reactive to light. EOM are normal.   Cardiovascular: Normal rate and regular rhythm.   Pulmonary/Chest: Breath sounds normal.   Musculoskeletal: Normal range of motion.   Neurological: She is oriented to person, place, and time. Gait normal.   Reflex Scores:       Tricep reflexes are 2+ on the right side and 2+ on the left side.       Bicep reflexes are 2+ on the right side and 2+ on the left side.       Brachioradialis reflexes are 2+ on the right side and  2+ on the left side.       Patellar reflexes are 2+ on the right side and 2+ on the left side.       Achilles reflexes are 2+ on the right side and 2+ on the left side.  Skin: Skin is warm.   Vitals reviewed.      Procedures    Assessment/Plan: I feel that she does have tardive dyskinesia.  Zofran can cause extrapyramidal symptoms as an adverse reaction.  Also Reglan has been found to cause extrapyramidal symptoms and parkinsonism.  I do not appreciate any parkinsonism on examination today however I do feel that she does have the tardive dyskinesia.  We discussed medical options.  She is quite hesitant to take medication in general.  She acknowledges that her symptoms have not greatly affected her quality of life.  We will follow her over the long-term and see her back in 6 months to follow-up on her symptoms.  A total of 15 minutes was spent face-to-face with the patient today.  Of that greater than 50% of this time was spent discussing signs and symptoms of tardive dyskinesia, patient education, plan of care and prognosis.       Valeria was seen today for tardive dyskinesia.    Diagnoses and all orders for this visit:    Tardive dyskinesia           Darian Coronado II, MD

## 2020-02-19 ENCOUNTER — OFFICE VISIT (OUTPATIENT)
Dept: FAMILY MEDICINE CLINIC | Facility: CLINIC | Age: 55
End: 2020-02-19

## 2020-02-19 VITALS
HEIGHT: 65 IN | OXYGEN SATURATION: 98 % | SYSTOLIC BLOOD PRESSURE: 130 MMHG | BODY MASS INDEX: 18.43 KG/M2 | WEIGHT: 110.6 LBS | TEMPERATURE: 98.9 F | DIASTOLIC BLOOD PRESSURE: 60 MMHG | HEART RATE: 89 BPM

## 2020-02-19 DIAGNOSIS — J40 BRONCHITIS: Primary | ICD-10-CM

## 2020-02-19 DIAGNOSIS — J06.9 URI WITH COUGH AND CONGESTION: ICD-10-CM

## 2020-02-19 PROCEDURE — 99214 OFFICE O/P EST MOD 30 MIN: CPT | Performed by: INTERNAL MEDICINE

## 2020-02-19 RX ORDER — DOXYCYCLINE HYCLATE 100 MG/1
100 CAPSULE ORAL DAILY
Qty: 7 CAPSULE | Refills: 0 | Status: SHIPPED | OUTPATIENT
Start: 2020-02-19 | End: 2021-07-08

## 2020-02-19 RX ORDER — BENZONATATE 100 MG/1
100 CAPSULE ORAL 3 TIMES DAILY
Qty: 30 CAPSULE | Refills: 0 | Status: SHIPPED | OUTPATIENT
Start: 2020-02-19 | End: 2021-07-08

## 2020-02-19 RX ORDER — BROMPHENIRAMINE MALEATE, PSEUDOEPHEDRINE HYDROCHLORIDE, AND DEXTROMETHORPHAN HYDROBROMIDE 2; 30; 10 MG/5ML; MG/5ML; MG/5ML
5 SYRUP ORAL 4 TIMES DAILY PRN
Qty: 118 ML | Refills: 1 | Status: SHIPPED | OUTPATIENT
Start: 2020-02-19 | End: 2021-07-08

## 2020-02-19 NOTE — PROGRESS NOTES
Subjective Chief complaint is nasal congestion and cough  Valeria Lemos is a 54 y.o. female.     History of Present Illness   Valeria is here today for complaints of moderate nasal congestion and cough.  This began actually 9 days ago more is a tickle in her throat.  She then developed congestion and cough.  She has not had fever.  She has not had chills.  She does not feel any more short of breath than usual.  She works as a .  She has tried over-the-counter DayQuil and NyQuil.  She is tried over-the-counter Mucinex.  She has tried Tylenol cold preparation.  None of this has seemed to help.    The following portions of the patient's history were reviewed and updated as appropriate: allergies, current medications, past family history, past medical history, past social history, past surgical history and problem list.    Review of Systems   Constitutional: Negative for chills and fever.   HENT: Positive for congestion, postnasal drip, rhinorrhea and sore throat.    Respiratory: Positive for cough. Negative for shortness of breath.        Objective   Physical Exam   Constitutional: She appears well-developed and well-nourished.   HENT:   Head: Normocephalic and atraumatic.   Tympanic membranes are normal.  There is bilateral nasal congestion but rhinorrhea is clear.  There is some posterior pharyngeal erythema but no exudate   Cardiovascular: Normal rate, regular rhythm and normal heart sounds.   Pulmonary/Chest: Effort normal. She has no wheezes.   She does have some upper airway rhonchi   Lymphadenopathy:     She has no cervical adenopathy.   Nursing note and vitals reviewed.        Assessment/Plan   Valeria was seen today for nasal congestion, cough and uri.    Diagnoses and all orders for this visit:    Bronchitis    URI with cough and congestion    Other orders  -     brompheniramine-pseudoephedrine-DM 30-2-10 MG/5ML syrup; Take 5 mL by mouth 4 (Four) Times a Day As Needed for Congestion or Cough.  -      benzonatate (TESSALON) 100 MG capsule; Take 1 capsule by mouth 3 (Three) Times a Day.  -     doxycycline (VIBRAMYCIN) 100 MG capsule; Take 1 capsule by mouth Daily.    Valeria is here today for respiratory symptoms.  I believe she has a bronchitis and I am going to use some doxycycline.  She is going to take Tessalon Perles routinely and use the Bromfed as needed.  I am going to keep her off work.  She may return to work on the 24th.

## 2020-12-04 ENCOUNTER — OFFICE VISIT (OUTPATIENT)
Dept: NEUROLOGY | Facility: CLINIC | Age: 55
End: 2020-12-04

## 2020-12-04 VITALS
DIASTOLIC BLOOD PRESSURE: 72 MMHG | HEART RATE: 88 BPM | OXYGEN SATURATION: 99 % | BODY MASS INDEX: 18.99 KG/M2 | HEIGHT: 65 IN | SYSTOLIC BLOOD PRESSURE: 114 MMHG | WEIGHT: 114 LBS

## 2020-12-04 DIAGNOSIS — G24.01 TARDIVE DYSKINESIA: Primary | ICD-10-CM

## 2020-12-04 PROCEDURE — 99213 OFFICE O/P EST LOW 20 MIN: CPT | Performed by: PSYCHIATRY & NEUROLOGY

## 2020-12-04 RX ORDER — LORATADINE 10 MG/1
10 TABLET ORAL DAILY
COMMUNITY

## 2020-12-04 RX ORDER — LANOLIN ALCOHOL/MO/W.PET/CERES
1000 CREAM (GRAM) TOPICAL DAILY
COMMUNITY

## 2020-12-04 RX ORDER — VITAMIN B COMPLEX
1 CAPSULE ORAL DAILY
COMMUNITY

## 2020-12-04 RX ORDER — UBIDECARENONE 100 MG
1000 CAPSULE ORAL DAILY
COMMUNITY

## 2020-12-04 RX ORDER — MULTIPLE VITAMINS W/ MINERALS TAB 9MG-400MCG
1 TAB ORAL DAILY
COMMUNITY

## 2020-12-04 NOTE — PROGRESS NOTES
Chief Complaint   Patient presents with   • Tardive Dyskinesia       Patient ID: Valeria Lemos is a 55 y.o. female.    HPI: I have had the pleasure of seeing your patient again today.  As you may know she is a 55-year-old female with a history of tardive dyskinesia.  She  continues to experience the symptoms of tardive dyskinesia particularly of the tongue jaw and left shoulder.  She describes her symptoms as approximately the same however occasionally she will experience some worsening.  The symptoms seem to exacerbate particularly when she is experiencing stress or physical activity.  She does notice the symptoms more so in the evenings as well.  She denies any new symptoms neurologically.  We have discussed initiating medical management for her symptoms in the past however she decided to forego that at the time.  She says that she still feels approximately the same.    The following portions of the patient's history were reviewed and updated as appropriate: allergies, current medications, past family history, past medical history, past social history, past surgical history and problem list.    Review of Systems   Neurological: Negative for dizziness, tremors, seizures, syncope, facial asymmetry, speech difficulty, weakness, light-headedness, numbness and headaches.   Hematological: Negative for adenopathy. Does not bruise/bleed easily.   Psychiatric/Behavioral: Negative for agitation, behavioral problems, confusion, decreased concentration, dysphoric mood, hallucinations, self-injury, sleep disturbance and suicidal ideas. The patient is not nervous/anxious and is not hyperactive.       I have reviewed the review of systems above performed by my medical assistant.      Vitals:    12/04/20 0851   BP: 114/72   Pulse: 88   SpO2: 99%       Neurologic Exam     Mental Status   Oriented to person, place, and time.   Concentration: normal.   Level of consciousness: alert  Knowledge: consistent with education (No deficits  found.).     Cranial Nerves     CN II   Visual fields full to confrontation.     CN III, IV, VI   Pupils are equal, round, and reactive to light.  Extraocular motions are normal.   CN III: no CN III palsy  CN VI: no CN VI palsy    CN V   Facial sensation intact.     CN VII   Facial expression full, symmetric.     CN VIII   CN VIII normal.     CN IX, X   CN IX normal.   CN X normal.     CN XI   CN XI normal.     CN XII   CN XII normal.     Motor Exam     Strength   Right neck flexion: 5/5  Left neck flexion: 5/5  Right neck extension: 5/5  Left neck extension: 5/5  Right deltoid: 5/5  Left deltoid: 5/5  Right biceps: 5/5  Left biceps: 5/5  Right triceps: 5/5  Left triceps: 5/5  Right wrist flexion: 5/5  Left wrist flexion: 5/5  Right wrist extension: 5/5  Left wrist extension: 5/5  Right interossei: 5/5  Left interossei: 5/5  Right abdominals: 5/5  Left abdominals: 5/5  Right iliopsoas: 5/5  Left iliopsoas: 5/5  Right quadriceps: 5/5  Left quadriceps: 5/5  Right hamstrin/5  Left hamstrin/5  Right glutei: 5/5  Left glutei: 5/5  Right anterior tibial: 5/5  Left anterior tibial: 5/5  Right posterior tibial: 5/5  Left posterior tibial: 5/5  Right peroneal: 5/5  Left peroneal: 5/5  Right gastroc: 5/5  Left gastroc: 5/5    Sensory Exam   Light touch normal.   Vibration normal.     Gait, Coordination, and Reflexes     Gait  Gait: normal    Reflexes   Right brachioradialis: 2+  Left brachioradialis: 2+  Right biceps: 2+  Left biceps: 2+  Right triceps: 2+  Left triceps: 2+  Right patellar: 2+  Left patellar: 2+  Right achilles: 2+  Left achilles: 2+  Right : 2+  Left : 2+Station is normal.       Physical Exam  Vitals signs reviewed.   Constitutional:       Appearance: She is well-developed.   HENT:      Head: Normocephalic and atraumatic.   Eyes:      Extraocular Movements: EOM normal.      Pupils: Pupils are equal, round, and reactive to light.   Cardiovascular:      Rate and Rhythm: Normal rate and regular  rhythm.   Pulmonary:      Breath sounds: Normal breath sounds.   Musculoskeletal: Normal range of motion.   Skin:     General: Skin is warm.   Neurological:      Mental Status: She is oriented to person, place, and time.      Gait: Gait is intact.      Deep Tendon Reflexes:      Reflex Scores:       Tricep reflexes are 2+ on the right side and 2+ on the left side.       Bicep reflexes are 2+ on the right side and 2+ on the left side.       Brachioradialis reflexes are 2+ on the right side and 2+ on the left side.       Patellar reflexes are 2+ on the right side and 2+ on the left side.       Achilles reflexes are 2+ on the right side and 2+ on the left side.        Procedures    Assessment/Plan: I would like to see her back in 6 months.  Again we will review her symptoms.  She is not wanting to start medical management for tardive dyskinesia as of yet.  A total of 15 minutes was spent discussing signs and symptoms of tardive dyskinesia, patient education, plan of care and prognosis.       Diagnoses and all orders for this visit:    1. Tardive dyskinesia (Primary)           Darian Coronado II, MD

## 2021-03-30 ENCOUNTER — BULK ORDERING (OUTPATIENT)
Dept: CASE MANAGEMENT | Facility: OTHER | Age: 56
End: 2021-03-30

## 2021-03-30 DIAGNOSIS — Z23 IMMUNIZATION DUE: ICD-10-CM

## 2021-07-06 ENCOUNTER — TELEPHONE (OUTPATIENT)
Dept: FAMILY MEDICINE CLINIC | Facility: CLINIC | Age: 56
End: 2021-07-06

## 2021-07-06 NOTE — TELEPHONE ENCOUNTER
Hub staff attempted to follow warm transfer process and was unsuccessful     Caller: Valeria Lemos    Relationship to patient: Self    Best call back number: [phone]    Patient is needing: PATIENT CALLED IN WITH SAME DAY SYMPTOMS OF LIGHTHEADED AND DIZZINESS. SHE WAS TAKEN OFF HER FLIGHT AND TO THE HOSPITAL IN [facility] DUE TO BEING ILL. SHE STARTED TO FEEL BETTER UNTIL TODAY. THERE ARE NO APPOINTMENTS TODAY. PLEASE CALL PATIENT AND ADVISE.

## 2021-07-08 ENCOUNTER — OFFICE VISIT (OUTPATIENT)
Dept: FAMILY MEDICINE CLINIC | Facility: CLINIC | Age: 56
End: 2021-07-08

## 2021-07-08 VITALS
DIASTOLIC BLOOD PRESSURE: 68 MMHG | BODY MASS INDEX: 19.22 KG/M2 | HEIGHT: 65 IN | WEIGHT: 115.4 LBS | HEART RATE: 79 BPM | SYSTOLIC BLOOD PRESSURE: 98 MMHG | RESPIRATION RATE: 20 BRPM | TEMPERATURE: 96.9 F | OXYGEN SATURATION: 98 %

## 2021-07-08 DIAGNOSIS — I95.9 HYPOTENSION, UNSPECIFIED HYPOTENSION TYPE: ICD-10-CM

## 2021-07-08 DIAGNOSIS — M26.623 BILATERAL TEMPOROMANDIBULAR JOINT PAIN: ICD-10-CM

## 2021-07-08 DIAGNOSIS — R42 DIZZINESS: Primary | ICD-10-CM

## 2021-07-08 DIAGNOSIS — E83.51 HYPOCALCEMIA: ICD-10-CM

## 2021-07-08 DIAGNOSIS — R11.2 NON-INTRACTABLE VOMITING WITH NAUSEA, UNSPECIFIED VOMITING TYPE: ICD-10-CM

## 2021-07-08 DIAGNOSIS — H92.03 OTALGIA OF BOTH EARS: ICD-10-CM

## 2021-07-08 PROBLEM — N95.1 PERIMENOPAUSE: Status: ACTIVE | Noted: 2018-12-12

## 2021-07-08 PROBLEM — L23.9 ALLERGIC CONTACT DERMATITIS, UNSPECIFIED CAUSE: Status: ACTIVE | Noted: 2019-04-29

## 2021-07-08 PROBLEM — R09.89 ABNORMAL VASOMOTOR FUNCTION: Status: ACTIVE | Noted: 2018-12-12

## 2021-07-08 PROBLEM — N92.6 IRREGULAR BLEEDING: Status: ACTIVE | Noted: 2020-07-31

## 2021-07-08 PROBLEM — N83.201 CYST OF RIGHT OVARY: Status: ACTIVE | Noted: 2018-05-17

## 2021-07-08 PROCEDURE — 99214 OFFICE O/P EST MOD 30 MIN: CPT | Performed by: INTERNAL MEDICINE

## 2021-07-08 RX ORDER — OMEPRAZOLE MAGNESIUM 10 MG/1
GRANULE, DELAYED RELEASE ORAL
COMMUNITY
End: 2021-07-08

## 2021-07-08 RX ORDER — ONDANSETRON 4 MG/1
4 TABLET, ORALLY DISINTEGRATING ORAL EVERY 6 HOURS PRN
COMMUNITY
Start: 2021-07-01 | End: 2021-07-19

## 2021-07-08 RX ORDER — AMOXICILLIN 875 MG/1
875 TABLET, COATED ORAL 2 TIMES DAILY
Qty: 14 TABLET | Refills: 0 | Status: SHIPPED | OUTPATIENT
Start: 2021-07-08 | End: 2021-09-03

## 2021-07-08 NOTE — PROGRESS NOTES
Subjective Chief complaint is dizziness and an ear problem  Valeria Lemos is a 55 y.o. female.     History of Present Illness Valeria is here today for dizziness.  She was evaluated extensively for this a few years back.  She had an MRI scan of the internal auditory canals at that time.  It did not show any significant abnormality.  She was seen by ear nose and throat doctors who did not feel like this was a vertigo type dizziness.  However it usually seems to occur while she is flying.  She works as a .  She apparently began feeling a little bit dizzy on the day before her most recent flight on July 1.  The patient had to be taken off the plane and sent to the \[facility]\"" in [place].  I am able to review those records.  Does look like she had a fairly normal blood count.  Her sugar was little bit high and her calcium was low.  I suspect she may have been hyperventilating from the nausea and vomiting.  She is now complaining of some bilateral ear pain.  She feels like there is fluid behind the ears.  Additionally she is complaining of intermittent tingling sensation in the lower left side of her face at the jawline.  He does have a history of tar dive dyskinesia from taking numerous medicines for nausea.  The following portions of the patient's history were reviewed and updated as appropriate: allergies, current medications, past family history, past medical history, past social history, past surgical history and problem list.    Review of Systems   Constitutional: Negative for chills and fever.   HENT: Positive for congestion and ear pain.    Gastrointestinal: Positive for nausea.   Neurological: Positive for dizziness, numbness and headache.       Objective   Physical Exam  Vitals and nursing note reviewed.   Constitutional:       Appearance: Normal appearance.   HENT:      Right Ear: Tympanic membrane and ear canal normal.      Left Ear: Tympanic membrane and ear canal normal.      Nose: Congestion  present.      Mouth/Throat:      Mouth: Mucous membranes are moist.      Pharynx: Oropharynx is clear.   Neck:      Vascular: No carotid bruit.   Cardiovascular:      Rate and Rhythm: Normal rate and regular rhythm.   Pulmonary:      Effort: Pulmonary effort is normal.   Abdominal:      General: Bowel sounds are normal.      Palpations: Abdomen is soft.      Tenderness: There is no abdominal tenderness. There is no guarding or rebound.   Musculoskeletal:      Right lower leg: No edema.      Left lower leg: No edema.      Comments: She does have tender TMJ joints bilaterally   Neurological:      Mental Status: She is alert.           Assessment/Plan   Diagnoses and all orders for this visit:    1. Dizziness (Primary)  -     Comprehensive Metabolic Panel  -     Cortisol    2. Non-intractable vomiting with nausea, unspecified vomiting type    3. Hypocalcemia  -     Calcium, Ionized    4. Hypotension, unspecified hypotension type    5. Otalgia of both ears    6. Bilateral temporomandibular joint pain    Other orders  -     amoxicillin (AMOXIL) 875 MG tablet; Take 1 tablet by mouth 2 (Two) Times a Day.  Dispense: 14 tablet; Refill: 0  -     Chlorcyclizine-Pseudoephed 25-60 MG tablet; Take 1 tablet by mouth Every 12 (Twelve) Hours As Needed (congestion) for up to 10 days.  Dispense: 20 tablet; Refill: 0    Valeria is here today for dizziness and vomiting.  I do not come up with a good reason for this by exam.  I am going to follow-up on the low calcium and I am going to check a cortisol because of her pressure being a little bit lower today than usual.  I do not see that she has an obvious ear infection.  I am going to give her some Stahist and some amoxicillin but I think most of her ear pain is from her TMJ joints.

## 2021-07-09 ENCOUNTER — TELEPHONE (OUTPATIENT)
Dept: FAMILY MEDICINE CLINIC | Facility: CLINIC | Age: 56
End: 2021-07-09

## 2021-07-09 DIAGNOSIS — R42 DIZZINESS: Primary | ICD-10-CM

## 2021-07-09 LAB
ALBUMIN SERPL-MCNC: 4.3 G/DL (ref 3.5–5.2)
ALBUMIN/GLOB SERPL: 2 G/DL
ALP SERPL-CCNC: 62 U/L (ref 39–117)
ALT SERPL-CCNC: 14 U/L (ref 1–33)
AST SERPL-CCNC: 15 U/L (ref 1–32)
BILIRUB SERPL-MCNC: 0.4 MG/DL (ref 0–1.2)
BUN SERPL-MCNC: 7 MG/DL (ref 6–20)
BUN/CREAT SERPL: 9.9 (ref 7–25)
CA-I SERPL ISE-MCNC: 4.9 MG/DL (ref 4.5–5.6)
CALCIUM SERPL-MCNC: 9.1 MG/DL (ref 8.6–10.5)
CHLORIDE SERPL-SCNC: 101 MMOL/L (ref 98–107)
CO2 SERPL-SCNC: 28.1 MMOL/L (ref 22–29)
CORTIS SERPL-MCNC: 7.1 UG/DL
CREAT SERPL-MCNC: 0.71 MG/DL (ref 0.57–1)
GLOBULIN SER CALC-MCNC: 2.1 GM/DL
GLUCOSE SERPL-MCNC: 64 MG/DL (ref 65–99)
POTASSIUM SERPL-SCNC: 4 MMOL/L (ref 3.5–5.2)
PROT SERPL-MCNC: 6.4 G/DL (ref 6–8.5)
SODIUM SERPL-SCNC: 137 MMOL/L (ref 136–145)

## 2021-07-09 NOTE — TELEPHONE ENCOUNTER
Called pharmacy and spoke with pharmacist and she said that they did received that prescription but they don't have it in stock right now and that is something they have to have a prescription for.  But she can get OTC at [facility] or [facility].  I called pt and notified her of this. Hub please inform patient

## 2021-07-09 NOTE — TELEPHONE ENCOUNTER
PATIENT STATES THAT SHE WAS TO HAVE 2 MEDICATIONS CALLED IN, AN ANTIBIOTIC AND A DECONGESTANT. SHE STATES THAT THE PHARMACY ONLY HAD RECORD OF THE ANTIBIOTIC.    PLEASE ADVISE

## 2021-07-16 ENCOUNTER — TELEPHONE (OUTPATIENT)
Dept: FAMILY MEDICINE CLINIC | Facility: CLINIC | Age: 56
End: 2021-07-16

## 2021-07-16 DIAGNOSIS — R14.0 BLOATING: Primary | ICD-10-CM

## 2021-07-16 DIAGNOSIS — R10.9 ABDOMINAL PAIN, UNSPECIFIED ABDOMINAL LOCATION: ICD-10-CM

## 2021-07-16 NOTE — TELEPHONE ENCOUNTER
Caller: Valeria Lemos    Relationship: Self    Best call back number: [phone]    What is the medical concern/diagnosis: STOMACH BLOATING    What specialty or service is being requested: GASTROENTEROLOGY    Any additional details: PATIENT SAID THAT SHE IS NOT FEELING WELL, HER STOMACH IS BLOATED AND IS VERY SENSITIVE TO TOUCH. SHE ASKED IF SHE COULD BE REFERRED TO A GASTROENTEROLOGIST.     She recently completed a course of antibiotics.  She has finished them.  She is having some stomach bloating and lower abdominal discomfort with diarrhea.  She is not having any blood in bowel movement.  She is not having fever or chills.  I advised that she can try taking a probiotic and using a little bit of Imodium A-D but if her symptoms get worse she may have to go to the emergency room for possible C. difficile colitis.

## 2021-07-19 ENCOUNTER — TELEPHONE (OUTPATIENT)
Dept: FAMILY MEDICINE CLINIC | Facility: CLINIC | Age: 56
End: 2021-07-19

## 2021-07-19 DIAGNOSIS — R19.7 DIARRHEA, UNSPECIFIED TYPE: Primary | ICD-10-CM

## 2021-07-19 RX ORDER — GLYCOPYRROLATE 1 MG/1
1 TABLET ORAL 2 TIMES DAILY
Qty: 20 TABLET | Refills: 0 | Status: SHIPPED | OUTPATIENT
Start: 2021-07-19 | End: 2021-10-08

## 2021-07-19 RX ORDER — ONDANSETRON 4 MG/1
4 TABLET, FILM COATED ORAL EVERY 8 HOURS PRN
Qty: 12 TABLET | Refills: 0 | Status: SHIPPED | OUTPATIENT
Start: 2021-07-19 | End: 2021-10-08

## 2021-07-19 NOTE — TELEPHONE ENCOUNTER
PATIENT CALLED, IS VERY NAUSEATED AND FATIGUED, DIARRHEA. SHE WANTED TO KNOW IF YOU TESTED FOR A BACTERIAL INFECTION. SHE STOPPED TAKING THE AMOXICILLIN. SHE WANTED TO SEE IF YOU COULD CALL IN SOME ZOFRAN FOR HER. SHE CAN NOT GET INTO GI DOCTOR UNTIL September. WANTING TO KNOW IF SHE CAN GET AN URGENT APPOINTMENT    [facility]/pharmacy #[id number] - [place], [place] - [id number] AT [facility] [place] [facility] - [phone]  - [phone] FX      PLEASE ADVISE  [phone]

## 2021-07-19 NOTE — TELEPHONE ENCOUNTER
Please advise     She reports that her lower abdominal pain is better.  Now she just feels fatigued and nauseated.  She can hardly eat.  She is still having diarrhea.  Did advise that we would order stool specimens.  Also advised that I will send something for nausea and diarrhea.  Again if she does not improve with this then she may have to go to the emergency room.  I do not think I can gain much by having her seen in the office.

## 2021-07-22 ENCOUNTER — LAB (OUTPATIENT)
Dept: LAB | Facility: HOSPITAL | Age: 56
End: 2021-07-22

## 2021-07-22 PROCEDURE — 87493 C DIFF AMPLIFIED PROBE: CPT | Performed by: INTERNAL MEDICINE

## 2021-08-17 ENCOUNTER — TELEPHONE (OUTPATIENT)
Dept: NEUROLOGY | Facility: CLINIC | Age: 56
End: 2021-08-17

## 2021-08-17 ENCOUNTER — TELEPHONE (OUTPATIENT)
Dept: FAMILY MEDICINE CLINIC | Facility: CLINIC | Age: 56
End: 2021-08-17

## 2021-08-17 NOTE — TELEPHONE ENCOUNTER
PLEASE ADVISE I EXPLAINED TO PATIENT THAT DR. CORBETT DOES NOT TREAT ANXIETY OR ATTACKS. sHE WOULD NEED TO DISCUSS GETTING IN TO SEE HER PCP TO DISCUSS WHAT HER OPTIONS ARE FOR THIS. OR GO TO ER IF NEEDED. PT STATED UNDERSTANDING.

## 2021-08-17 NOTE — TELEPHONE ENCOUNTER
PATIENT CALLED ASKING TO BE SEEN BY DR. CORBETT TODAY. SAID SHE THOUGHT SHE WAS EXPERIENCING A PANIC ATTACK AND DIDN'T THINK SHE WOULD BE ABLE TO GO TO WORK TODAY.    SAID THOUGHT ANXIETY SHE'D BEEN EXPERIENCING FOR LAST 6 WEEKS WAS DUE TO A HORMONE PATCH SHE'S BEEN WEARING, BUT WAS UNABLE TO GET IN WITH GYN TODAY.     WARM TRANSFERRED HER THROUGH TO [facility]

## 2021-08-19 ENCOUNTER — TELEPHONE (OUTPATIENT)
Dept: FAMILY MEDICINE CLINIC | Facility: CLINIC | Age: 56
End: 2021-08-19

## 2021-08-19 DIAGNOSIS — F41.9 ANXIETY: Primary | ICD-10-CM

## 2021-08-19 DIAGNOSIS — R42 DIZZINESS: ICD-10-CM

## 2021-08-19 DIAGNOSIS — R11.2 NON-INTRACTABLE VOMITING WITH NAUSEA, UNSPECIFIED VOMITING TYPE: ICD-10-CM

## 2021-08-19 NOTE — TELEPHONE ENCOUNTER
Hub staff attempted to follow warm transfer process and was unsuccessful     Caller: Valeria Lemos    Relationship to patient: Self    Best call back number: [phone]     Patient is needing: LAB APPOINTMENT FIRST THING ON 08/19/21 PER DR. BUCIO

## 2021-08-19 NOTE — TELEPHONE ENCOUNTER
Caller: Valeria Lemos    Relationship: Self    Best call back number: [phone]    What orders are you requesting (i.e. lab or imaging): LABS    In what timeframe would the patient need to come in: AS SOON AS POSSIBLE    Where will you receive your lab/imaging services: ANYWHERE    Additional notes: PATIENT ASKED FOR DR. BUCIO TO SCHEDULE HER A LAB APPOINTMENT TO TEST HER HORMONE LEVELS.     Patient is continuing to have difficulty with flying.  She has nausea and vomiting and dizziness.  I advised that these are not likely going to be hormonal related issues.  She reports that she is feeling anxious and jittery.  I suspect that she will likely need to see a psychiatrist.  I am going to put her on some hormone levels but I did advise her that other referrals to specialist might take several months.

## 2021-08-20 ENCOUNTER — OFFICE VISIT (OUTPATIENT)
Dept: FAMILY MEDICINE CLINIC | Facility: CLINIC | Age: 56
End: 2021-08-20

## 2021-08-20 VITALS — HEIGHT: 65 IN | BODY MASS INDEX: 19.2 KG/M2

## 2021-08-20 DIAGNOSIS — R06.4 HYPERVENTILATION: ICD-10-CM

## 2021-08-20 DIAGNOSIS — F41.0 ANXIETY ATTACK: Primary | ICD-10-CM

## 2021-08-20 PROCEDURE — 99214 OFFICE O/P EST MOD 30 MIN: CPT | Performed by: INTERNAL MEDICINE

## 2021-08-20 RX ORDER — LORAZEPAM 0.5 MG/1
0.5 TABLET ORAL EVERY 8 HOURS PRN
Qty: 30 TABLET | Refills: 0 | Status: SHIPPED | OUTPATIENT
Start: 2021-08-20 | End: 2021-10-25 | Stop reason: SDUPTHER

## 2021-08-20 RX ORDER — ESCITALOPRAM OXALATE 10 MG/1
5 TABLET ORAL DAILY
Qty: 15 TABLET | Refills: 2 | Status: SHIPPED | OUTPATIENT
Start: 2021-08-20 | End: 2021-09-14 | Stop reason: SDUPTHER

## 2021-08-20 NOTE — PROGRESS NOTES
Subjective Chief complaint is anxiety  Valeria Lemos is a 56 y.o. female.     History of Present Illness came in today to have lab work drawn.  She was quite anxious and shaking.  She is convinced that this is all hormonally related.  She is convinced that her current hormone replacement is not working.  She reports that she had similar symptoms prior to being started on the hormone therapy.  She has not been able to work for approximately 1 month or so since having some sort of attack on the plane and having to be seen in an emergency room in [place].  Her laboratories and tests at that time looked okay.  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    The following portions of the patient's history were reviewed and updated as appropriate: allergies, current medications, past family history, past medical history, past social history, past surgical history and problem list.    Review of Systems   Constitutional: Negative for chills and fever.   Respiratory: Negative for cough.    Gastrointestinal: Positive for nausea.   Psychiatric/Behavioral: The patient is nervous/anxious.        Objective   Physical Exam  Vitals and nursing note reviewed.   Cardiovascular:      Rate and Rhythm: Normal rate.      Pulses: Normal pulses.      Heart sounds: No murmur heard.   No friction rub. No gallop.       Comments: Her heart rate is approximately 96  Pulmonary:      Breath sounds: No wheezing, rhonchi or rales.      Comments: Respirations are unlabored but she is hyperventilating at a rate of approximately 20.  Musculoskeletal:      Right lower leg: No edema.      Left lower leg: No edema.   Neurological:      General: No focal deficit present.      Mental Status: She is alert.      Comments: She has a generalized tremor           Assessment/Plan   Diagnoses and all orders for this visit:    1. Anxiety attack (Primary)  -     LORazepam (Ativan) 0.5 MG tablet; Take 1 tablet  by mouth Every 8 (Eight) Hours As Needed for Anxiety.  Dispense: 30 tablet; Refill: 0  -     Comprehensive Metabolic Panel    2. Hyperventilation  -     LORazepam (Ativan) 0.5 MG tablet; Take 1 tablet by mouth Every 8 (Eight) Hours As Needed for Anxiety.  Dispense: 30 tablet; Refill: 0  -     Comprehensive Metabolic Panel    Other orders  -     escitalopram (Lexapro) 10 MG tablet; Take 0.5 tablets by mouth Daily.  Dispense: 15 tablet; Refill: 2    Valeria is here today for anxiety.  She appears to be hyperventilating.  We are going to check her hormone levels but I doubt that is the cause of her current anxiety.  I am going to prescribe some low-dose E citalopram.  Because of her weight I am only going to have her take 5 mg/day.  I did advise that some of these serotonin agents can exacerbate tardive symptoms and if she gets that she should let me know.  Have also prescribe some lorazepam to take on an as-needed basis.

## 2021-08-24 ENCOUNTER — TELEPHONE (OUTPATIENT)
Dept: FAMILY MEDICINE CLINIC | Facility: CLINIC | Age: 56
End: 2021-08-24

## 2021-08-24 NOTE — TELEPHONE ENCOUNTER
PATIENT CALLED AND IS NEEDING A DR NOTE EXCUSE HER FROM HER TRIP FROM 08/24-09/07 FOR HER EMPLOYER [employer]. [phone]       PATIENT IS ALSO WANTING TO KNOW IF LA PAPERWORK IS COMPLETED. PATIENT WOULD LIKE A CALL TO DISCUSS PAPERWORK     CALL BACK   [phone]

## 2021-08-25 ENCOUNTER — TELEPHONE (OUTPATIENT)
Dept: FAMILY MEDICINE CLINIC | Facility: CLINIC | Age: 56
End: 2021-08-25

## 2021-08-25 NOTE — TELEPHONE ENCOUNTER
Caller: FELICIANO BURCH    Relationship: PATIENT    Best call back number:[phone]     Caller requesting test results: FELICIANO BURCH    What test was performed: LABS    When was the test performed: 08/20/21    Where was the test performed: [facility]    PLEASE ADVISE.    I advised that the lab results were not back yet.  Some of these may be run only on certain days and that may be holding up her tests

## 2021-08-26 ENCOUNTER — TELEPHONE (OUTPATIENT)
Dept: FAMILY MEDICINE CLINIC | Facility: CLINIC | Age: 56
End: 2021-08-26

## 2021-08-26 NOTE — TELEPHONE ENCOUNTER
Caller: Valeria Lemos    Relationship to patient: Self    Best call back number: [phone]    Patient is needing: PATIENT HAS PAPER WORK FOR SHORT TERM DISABILITY BEING SENT BY [employer]. PLEASE LET HER KNOW IF THIS WILL BE FAXED BACK AND WHEN.     I advised the patient that I have not seen the paperwork from [facility].  I will keep an eye out for it

## 2021-08-27 DIAGNOSIS — R79.89 ELEVATED PROLACTIN LEVEL: Primary | ICD-10-CM

## 2021-09-01 ENCOUNTER — TELEPHONE (OUTPATIENT)
Dept: FAMILY MEDICINE CLINIC | Facility: CLINIC | Age: 56
End: 2021-09-01

## 2021-09-01 NOTE — TELEPHONE ENCOUNTER
Caller: Valeria Lemos    Relationship: Self    Best call back number: [phone]    Medication needed: VALIUM      When do you need the refill by: 09/07/21    What additional details did the patient provide when requesting the medication: PATIENT IS CALLING TO REQUEST A VALIUM FOR THE MRI THAT IS SCHEDULED 09/08/21.    Does the patient have less than a 3 day supply:  [x] Yes  [] No    What is the patient's preferred pharmacy:    [facility]/pharmacy #[id number] [place], [place] - [address] AT [facility] - [phone]  - [phone]   [phone]      PATIENT IS ALSO WANTING TO KNOW IF DR. BUCIO RECEIVED HER SHORT TERM PAPERWORK.    PLEASE ADVISE.      I advised that the lorazepam that gave her could be taken before the MRI scan.  I would advise taking 2 of them prior to the MRI scan and keeping to with her just in case

## 2021-09-03 ENCOUNTER — OFFICE VISIT (OUTPATIENT)
Dept: FAMILY MEDICINE CLINIC | Facility: CLINIC | Age: 56
End: 2021-09-03

## 2021-09-03 VITALS
HEART RATE: 78 BPM | DIASTOLIC BLOOD PRESSURE: 68 MMHG | HEIGHT: 65 IN | TEMPERATURE: 97.3 F | RESPIRATION RATE: 18 BRPM | SYSTOLIC BLOOD PRESSURE: 102 MMHG | WEIGHT: 111 LBS | BODY MASS INDEX: 18.49 KG/M2 | OXYGEN SATURATION: 97 %

## 2021-09-03 DIAGNOSIS — F41.0 PANIC ATTACKS: ICD-10-CM

## 2021-09-03 DIAGNOSIS — F41.9 ANXIETY: Primary | ICD-10-CM

## 2021-09-03 PROCEDURE — 99213 OFFICE O/P EST LOW 20 MIN: CPT | Performed by: INTERNAL MEDICINE

## 2021-09-03 NOTE — PROGRESS NOTES
Subjective Complaint is follow-up on anxiety for short-term disability  Valeria Lemos is a 56 y.o. female.     History of Present Illness Valeria is here today for follow-up.  At her last visit we did start some generic Lexapro.  Was started on 8 5 mg dose for several days and she is now on the 10 mg dose.  She is tolerating it fairly well but it is giving her little bit of a headache.  She is getting a little bit better in terms of anxiety.  She has not had a full-blown panic attack like she had when she was here on the 20th.  She is using a half of a 0.5 mg lorazepam in the morning and that also is seeming to help.  She has an upcoming MRI scan for an elevated prolactin level.  We did discuss that I think this is due to her estrogen replacement but we are just making sure that she does not have a prolactinoma.    The following portions of the patient's history were reviewed and updated as appropriate: allergies, current medications, past family history, past medical history, past social history, past surgical history and problem list.    Review of Systems   Psychiatric/Behavioral: Positive for stress. The patient is nervous/anxious.        Objective   Physical Exam  Vitals and nursing note reviewed.   Constitutional:       Appearance: Normal appearance.   Neurological:      Mental Status: She is alert.   Psychiatric:         Mood and Affect: Mood is anxious.         Behavior: Behavior is cooperative.      Comments: She is less tremulous.  She seems to be a little bit fidgety in terms of moving her arms and legs today.           Assessment/Plan   Diagnoses and all orders for this visit:    1. Anxiety (Primary)    2. Panic attacks    Baldemar is here today for follow-up.  She is getting some improvement from the medication.  She still seems a little bit fidgety here today but is not having a full-blown anxiety attack like she was last time.  I think the medicine is going to take a little bit longer to fully get in her  system.  Typically this will take 3 to 4 weeks total.  I am going to extend her short-term disability for at least 2 weeks and she will follow up with me then.  I really still think that she is likely going to need to be off until October 1 her somewhere in that area.

## 2021-09-08 ENCOUNTER — HOSPITAL ENCOUNTER (OUTPATIENT)
Dept: MRI IMAGING | Facility: HOSPITAL | Age: 56
Discharge: HOME | End: 2021-09-08

## 2021-09-08 DIAGNOSIS — R79.89 ELEVATED PROLACTIN LEVEL: ICD-10-CM

## 2021-09-09 ENCOUNTER — TELEPHONE (OUTPATIENT)
Dept: FAMILY MEDICINE CLINIC | Facility: CLINIC | Age: 56
End: 2021-09-09

## 2021-09-09 NOTE — TELEPHONE ENCOUNTER
Caller: RAMIRO    Relationship:     Best call back number: [phone]    What is the best time to reach you: ANYTIME    Who are you requesting to speak with (clinical staff, provider,  specific staff member): DR BUCIO OR MA    What was the call regarding: RAMIRO FROM [facility] STATES PATIENT WAS UNABLE TO COMPLETE THE MRI. WOULD LIKE TO KNOW IF DR BUCIO WOULD RECOMMEND PATIENT HAVE ANAESTHESIA TO ASSIST DURING THE TEST.     Do you require a callback: YES

## 2021-09-09 NOTE — TELEPHONE ENCOUNTER
Caller: Valeria Lemos    Relationship to patient: Self    Best call back number: [phone]    Patient is needing: PATIENT IS CALLING TO LET MD BUCIO THAT SHE WAS UNABLE TO GO THROUGH WITH THE MRI YESTERDAY AND PANICKED EVEN WITH TAKING THE ATIVAN MEDICATION. SHE WOULD LIKE TO KNOW IF MRI ORDER COULD BE SENT TO [facility] IM[facility]ING SO SHE CAN HAVE IT DONE THERE DUE TO IT HAVING A BIGGER MRI AND ALSO IF SOMETHING STRONGER COULD BE CALLED IN FOR HER       [facility]/pharmacy #[id number] [place], [place] - [address] AT [facility] [phone] [facility] [phone] FX    I left a message that we could either do the imaging at [facility] with some Valium or we can reschedule it at [facility] with anesthesiology present to use some propofol or something such as that

## 2021-09-10 ENCOUNTER — TELEPHONE (OUTPATIENT)
Dept: FAMILY MEDICINE CLINIC | Facility: CLINIC | Age: 56
End: 2021-09-10

## 2021-09-10 DIAGNOSIS — R79.89 ELEVATED PROLACTIN LEVEL: Primary | ICD-10-CM

## 2021-09-10 RX ORDER — DIAZEPAM 5 MG/1
TABLET ORAL
Qty: 2 TABLET | Refills: 0 | Status: SHIPPED | OUTPATIENT
Start: 2021-09-10 | End: 2021-09-24

## 2021-09-10 NOTE — TELEPHONE ENCOUNTER
Caller: Feliciano Burch    Relationship: Self    Best call back number: [phone] (H)    What is the best time to reach you: ANYTIME    Who are you requesting to speak with (clinical staff, provider,  specific staff member): CLINICAL STAFF/ DR BUCIO    Do you know the name of the person who called: FELICIANO BURCH    What was the call regarding: PATIENT RETURNED DR BUCIO' CALL AND STATED SHE WANTS TO GO [facility] FOR MRI AND WOULD LIKE TO HAVE A VALIUM FOR THE MRI, PLEASE ADVISE ASAP    Do you require a callback: YES, ASAP

## 2021-09-10 NOTE — TELEPHONE ENCOUNTER
Caller: RAMIRO WITH [employer]     Relationship:     Best call back number: [phone]    What is the best time to reach you: ANYTIME 8-4:30     Who are you requesting to speak with (clinical staff, provider,  specific staff member): DR. BUCIO     Do you know the name of the person who called:     What was the call regarding:A NEW ORDER FOR MRI OF PITUITARY WITH ANESTHESIA NEEDED DUE TO PATIENT BEING CLAUSTROPHOBIC      Do you require a callback: IF NEEDED        THANKS

## 2021-09-14 RX ORDER — ESCITALOPRAM OXALATE 10 MG/1
5 TABLET ORAL DAILY
Qty: 45 TABLET | Refills: 1 | Status: SHIPPED | OUTPATIENT
Start: 2021-09-14 | End: 2021-09-24

## 2021-09-17 ENCOUNTER — OFFICE VISIT (OUTPATIENT)
Dept: FAMILY MEDICINE CLINIC | Facility: CLINIC | Age: 56
End: 2021-09-17

## 2021-09-17 VITALS
HEART RATE: 88 BPM | OXYGEN SATURATION: 99 % | TEMPERATURE: 97.9 F | SYSTOLIC BLOOD PRESSURE: 92 MMHG | BODY MASS INDEX: 18.16 KG/M2 | WEIGHT: 109 LBS | HEIGHT: 65 IN | DIASTOLIC BLOOD PRESSURE: 60 MMHG

## 2021-09-17 DIAGNOSIS — R79.89 ELEVATED CORTISOL LEVEL: ICD-10-CM

## 2021-09-17 DIAGNOSIS — F41.1 ANXIETY, GENERALIZED: Primary | ICD-10-CM

## 2021-09-17 DIAGNOSIS — F41.0 PANIC ATTACKS: ICD-10-CM

## 2021-09-17 DIAGNOSIS — R79.89 ELEVATED PROLACTIN LEVEL: ICD-10-CM

## 2021-09-17 DIAGNOSIS — Z91.018 FOOD ALLERGY: ICD-10-CM

## 2021-09-17 PROCEDURE — 99213 OFFICE O/P EST LOW 20 MIN: CPT | Performed by: INTERNAL MEDICINE

## 2021-09-17 NOTE — PROGRESS NOTES
Subjective Chief complaint is follow-up on anxiety  Valeria Lemos is a 56 y.o. female.     History of Present Illness Valeria is here today for follow-up.  Since her last visit she reports that things may be only slightly better.  She is still feeling on the verge of an anxiety attack on a daily basis.  She is using the lorazepam half a tablet when she feels this coming on.  Her weight is dropping.  It is dropped by approximately 6 pounds since July.  It is difficult to tell whether the E citalopram could be suppressing her appetite.  She did see the gastroenterologist.  He did a food allergy test and it does appear that she is allergic to oats and wheat.  They did not actually do gluten sensitivity testing.    The following portions of the patient's history were reviewed and updated as appropriate: allergies, current medications, past family history, past medical history, past social history, past surgical history and problem list.    Review of Systems   Constitutional: Negative for appetite change and fever.   Respiratory: Negative for cough.    Psychiatric/Behavioral: Positive for stress. The patient is nervous/anxious.        Objective   Physical Exam  Vitals and nursing note reviewed.   Constitutional:       Appearance: Normal appearance.   Cardiovascular:      Rate and Rhythm: Normal rate and regular rhythm.   Pulmonary:      Effort: Pulmonary effort is normal.      Breath sounds: Normal breath sounds.   Neurological:      Mental Status: She is alert.   Psychiatric:         Attention and Perception: Attention normal.         Mood and Affect: Mood is anxious.         Speech: Speech normal.         Behavior: Behavior normal.           Assessment/Plan   Diagnoses and all orders for this visit:    1. Anxiety, generalized (Primary)    2. Panic attacks    3. Food allergy  -     Celiac Comprehensive Panel    4. Elevated cortisol level  -     Cortisol  -     ACTH    5. Elevated prolactin level  -     Prolactin    Valeria  is here today for follow-up.  We are going to continue her current therapy.  I am going to check a celiac panel as opposed to just a food allergy panel.  I am also going to recheck her cortisol ACTH and prolactin.

## 2021-09-20 LAB
ACTH PLAS-MCNC: 18.5 PG/ML (ref 7.2–63.3)
CORTIS SERPL-MCNC: 8.8 UG/DL
ENDOMYSIUM IGA SER QL: NEGATIVE
GLIADIN PEPTIDE IGA SER-ACNC: 3 UNITS (ref 0–19)
GLIADIN PEPTIDE IGG SER-ACNC: 2 UNITS (ref 0–19)
IGA SERPL-MCNC: 208 MG/DL (ref 87–352)
PROLACTIN SERPL-MCNC: 17 NG/ML (ref 4.8–23.3)
TTG IGA SER-ACNC: <2 U/ML (ref 0–3)
TTG IGG SER-ACNC: <2 U/ML (ref 0–5)

## 2021-09-21 ENCOUNTER — TELEPHONE (OUTPATIENT)
Dept: FAMILY MEDICINE CLINIC | Facility: CLINIC | Age: 56
End: 2021-09-21

## 2021-09-21 NOTE — TELEPHONE ENCOUNTER
THERE SEEMS TO BE AN ACTIVE AUTHORIZATION FOR A MRI OF PITUITARY W AND W/O     SHE HAS AN APPT TOMORROW SO,   PLEASE TAKE A LOOK AT THIS AND CALL      [facility] ([facility]) [phone] Remove    [phone] EXT [id number] [address]          [facility]

## 2021-09-24 ENCOUNTER — OFFICE VISIT (OUTPATIENT)
Dept: FAMILY MEDICINE CLINIC | Facility: CLINIC | Age: 56
End: 2021-09-24

## 2021-09-24 VITALS
SYSTOLIC BLOOD PRESSURE: 112 MMHG | WEIGHT: 110.8 LBS | OXYGEN SATURATION: 99 % | HEART RATE: 73 BPM | BODY MASS INDEX: 18.46 KG/M2 | TEMPERATURE: 97.9 F | DIASTOLIC BLOOD PRESSURE: 60 MMHG | HEIGHT: 65 IN

## 2021-09-24 DIAGNOSIS — F41.1 ANXIETY, GENERALIZED: Primary | ICD-10-CM

## 2021-09-24 DIAGNOSIS — F41.0 PANIC ATTACKS: ICD-10-CM

## 2021-09-24 PROCEDURE — 99214 OFFICE O/P EST MOD 30 MIN: CPT | Performed by: INTERNAL MEDICINE

## 2021-09-24 RX ORDER — PROGESTERONE 100 MG/1
CAPSULE ORAL
COMMUNITY
Start: 2021-09-14

## 2021-09-24 RX ORDER — PAROXETINE 10 MG/1
10 TABLET, FILM COATED ORAL EVERY MORNING
Qty: 30 TABLET | Refills: 5 | Status: SHIPPED | OUTPATIENT
Start: 2021-09-24 | End: 2021-10-08 | Stop reason: SINTOL

## 2021-09-24 RX ORDER — ESTRADIOL 0.06 MG/D
1 PATCH TRANSDERMAL WEEKLY
COMMUNITY
Start: 2021-09-20

## 2021-09-24 NOTE — PROGRESS NOTES
Subjective Complaint is follow-up for anxiety and panic attacks  Valeria Lemos is a 56 y.o. female.     History of Present Illness Valeria is here today for follow-up.  She is still having anxiety attacks.  She is not tolerating even a half a dose of the E citalopram well.  It makes her feel drowsy and groggy.  She also is only taking a quarter of the lorazepam at a time.  She was able to get the MRI scan of the brain done.  It did require 2 Valium's in an open MRI scan.  I do not have the results of that back yet.  We did discuss a referral to a psychiatrist.  They may be able to do some gene testing to see what type of medicine may be best for her.    The following portions of the patient's history were reviewed and updated as appropriate: allergies, current medications, past family history, past medical history, past social history, past surgical history and problem list.    Review of Systems   Constitutional: Negative for chills and fever.   Psychiatric/Behavioral: The patient is nervous/anxious.        Objective   Physical Exam  Vitals and nursing note reviewed.   Constitutional:       Appearance: Normal appearance.   Cardiovascular:      Rate and Rhythm: Normal rate and regular rhythm.   Pulmonary:      Effort: Pulmonary effort is normal.      Breath sounds: No wheezing or rales.   Neurological:      Mental Status: She is alert.   Psychiatric:         Attention and Perception: Attention normal.         Mood and Affect: Mood is anxious.         Speech: Speech normal.         Behavior: Behavior normal.           Assessment/Plan   Diagnoses and all orders for this visit:    1. Anxiety, generalized (Primary)  -     Ambulatory Referral to Psychiatry    2. Panic attacks  -     Ambulatory Referral to Psychiatry    Other orders  -     PARoxetine (Paxil) 10 MG tablet; Take 1 tablet by mouth Every Morning.  Dispense: 30 tablet; Refill: 5    Valeria is here today for her panic attacks.  I am going to have her stop the Lexapro  and we are going to switch her to generic Paxil.  I am going to do a referral to a psychiatrist for further delineation of treatment.

## 2021-09-30 ENCOUNTER — TELEPHONE (OUTPATIENT)
Dept: FAMILY MEDICINE CLINIC | Facility: CLINIC | Age: 56
End: 2021-09-30

## 2021-09-30 NOTE — TELEPHONE ENCOUNTER
Caller: Valeria Lemos    Relationship: Self    Best call back number: [phone]     What form or medical record are you requesting: NOTE     Who is requesting this form or medical record from you: [facility]    How would you like to receive the form or medical records (pick-up, mail, fax): FAX, PATIENT STATED FAX NUMBER SHOULD BE IN FILE      Timeframe paperwork needed: ASAP    Additional notes: PATIENT STATES SHE NEEDS A NOTE SENT TO [facility] STATING SHE IS OFF WORK TIL 11/01.   PLEASE CALL PATIENT WHEN SENT

## 2021-10-04 ENCOUNTER — TELEPHONE (OUTPATIENT)
Dept: FAMILY MEDICINE CLINIC | Facility: CLINIC | Age: 56
End: 2021-10-04

## 2021-10-04 DIAGNOSIS — F41.0 PANIC ATTACKS: Primary | ICD-10-CM

## 2021-10-04 DIAGNOSIS — F41.1 ANXIETY, GENERALIZED: ICD-10-CM

## 2021-10-04 NOTE — TELEPHONE ENCOUNTER
Caller: Valeria Lemos    Relationship: Self    Best call back number: [phone] (H)    What is the best time to reach you: ANYTIME    Who are you requesting to speak with (clinical staff, provider,  specific staff member): CLINICAL STAFF    What was the call regarding: PATIENT STATES WAS REFERRED BY DR BUCIO TO [facility]YSI[facility]RI[facility]O [facility] PATIENT STATES CONTACTED THAT OFFICE AND SHE IS NOT ACCEPTING NEW PATIENT'S RIGHT NOW AND PATIENT WOULD LIKE TO KNOW IF DR BUCIO HAS ANY OTHER SUGGESTIONS    Do you require a callback: YES

## 2021-10-05 ENCOUNTER — TELEPHONE (OUTPATIENT)
Dept: FAMILY MEDICINE CLINIC | Facility: CLINIC | Age: 56
End: 2021-10-05

## 2021-10-05 PROBLEM — F41.1 ANXIETY, GENERALIZED: Status: ACTIVE | Noted: 2021-10-05

## 2021-10-05 PROBLEM — F41.0 PANIC ATTACKS: Status: ACTIVE | Noted: 2021-10-05

## 2021-10-05 NOTE — TELEPHONE ENCOUNTER
PATIENT REQUESTING A CALL BACK REGARDING THE SHORT-TERM DISABILITY PAPERWORK. PATIENT STATES IT'S VERY IMPORTANT SHE SPEAK TO SOMEONE SOON OR SHE MAY NOT GET PAID.    PATIENT CALLBACK: [phone]

## 2021-10-05 NOTE — TELEPHONE ENCOUNTER
Caller: Valeria Lemos    Relationship: Self    Best call back number: [phone]    Caller requesting test results: SELF    What test was performed: MRI OF PITUITARY     Additional notes: PATIENT REQUESTING A CALLBACK WITH MRI RESULTS.

## 2021-10-06 DIAGNOSIS — R79.89 ELEVATED PROLACTIN LEVEL: ICD-10-CM

## 2021-10-08 ENCOUNTER — OFFICE VISIT (OUTPATIENT)
Dept: FAMILY MEDICINE CLINIC | Facility: CLINIC | Age: 56
End: 2021-10-08

## 2021-10-08 VITALS
TEMPERATURE: 98.1 F | BODY MASS INDEX: 18.33 KG/M2 | OXYGEN SATURATION: 97 % | SYSTOLIC BLOOD PRESSURE: 106 MMHG | HEIGHT: 65 IN | WEIGHT: 110 LBS | DIASTOLIC BLOOD PRESSURE: 70 MMHG | HEART RATE: 73 BPM

## 2021-10-08 DIAGNOSIS — F41.0 PANIC ATTACKS: Primary | ICD-10-CM

## 2021-10-08 DIAGNOSIS — R51.9 ACUTE NONINTRACTABLE HEADACHE, UNSPECIFIED HEADACHE TYPE: ICD-10-CM

## 2021-10-08 PROCEDURE — 99213 OFFICE O/P EST LOW 20 MIN: CPT | Performed by: INTERNAL MEDICINE

## 2021-10-08 NOTE — PROGRESS NOTES
Subjective Chief complaint is follow-up on anxiety  Valeria Lemos is a 56 y.o. female.     History of Present Illness Valeria is here today for follow-up on her anxiety.  At her last visit we tried her on a little bit low-dose Paxil.  We started her only on 10 mg.  She began having headaches.  She feels like her head will explode.  She had an MRI scan of the brain recently for an elevated prolactin level.  The MRI scan of the brain showed no pituitary abnormalities and no other significant abnormalities in the brain.  Her prolactin level did return to normal.  She did not tolerate Lexapro.  We are going to have her stop the Paxil and see if the headaches go away and then we will have to regroup and decide what to try her on.  She had been using Xanax fairly infrequently.  She had 30 tablets in August and still has proximately 12 left.  However because of cutting back on the Paxil she has needed a little bit more of it lately.    The following portions of the patient's history were reviewed and updated as appropriate: allergies, current medications, past family history, past medical history, past social history, past surgical history and problem list.    Review of Systems   Constitutional: Negative for chills and fever.   Respiratory: Negative for cough.    Neurological: Positive for headache.   Psychiatric/Behavioral: The patient is nervous/anxious.        Objective   Physical Exam  Constitutional:       Appearance: Normal appearance.   HENT:      Right Ear: Tympanic membrane and ear canal normal.      Left Ear: Tympanic membrane and ear canal normal.      Nose: No congestion.      Mouth/Throat:      Mouth: Mucous membranes are moist.      Pharynx: Oropharynx is clear.   Neurological:      General: No focal deficit present.      Mental Status: She is alert.      Cranial Nerves: No cranial nerve deficit.      Coordination: Coordination normal.           Assessment/Plan   Diagnoses and all orders for this visit:    1.  Panic attacks (Primary)    2. Acute nonintractable headache, unspecified headache type    Valeria is here today for follow-up.  Unfortunately she has not tolerated the Paxil.  I suspect that is the reason for her headaches.  Exam wise there is nothing to cause this and she had a recent MRI scan showing no significant reason.  We are going to stop the Paxil.  That really leads me very few options.  We could consider using some Zoloft which is metabolized in a different pathway but still has some CY P2 D6 activity.  She does however feel like since cutting back on the dose of the Paxil that her anxiety seems to be worse.  I therefore think she will not be able to return to work for at least until after 10/22/2021.  I will see her back before then.  Unfortunately we have not been able to get her into see a psychiatrist due to availability.

## 2021-10-20 ENCOUNTER — OFFICE VISIT (OUTPATIENT)
Dept: FAMILY MEDICINE CLINIC | Facility: CLINIC | Age: 56
End: 2021-10-20

## 2021-10-20 VITALS
SYSTOLIC BLOOD PRESSURE: 104 MMHG | DIASTOLIC BLOOD PRESSURE: 74 MMHG | OXYGEN SATURATION: 98 % | HEART RATE: 81 BPM | WEIGHT: 112 LBS | BODY MASS INDEX: 18.66 KG/M2 | HEIGHT: 65 IN

## 2021-10-20 DIAGNOSIS — F41.1 ANXIETY, GENERALIZED: ICD-10-CM

## 2021-10-20 DIAGNOSIS — F41.0 PANIC ATTACKS: ICD-10-CM

## 2021-10-20 DIAGNOSIS — Z23 NEED FOR INFLUENZA VACCINATION: Primary | ICD-10-CM

## 2021-10-20 PROCEDURE — 90686 IIV4 VACC NO PRSV 0.5 ML IM: CPT | Performed by: INTERNAL MEDICINE

## 2021-10-20 PROCEDURE — 90471 IMMUNIZATION ADMIN: CPT | Performed by: INTERNAL MEDICINE

## 2021-10-20 PROCEDURE — 99213 OFFICE O/P EST LOW 20 MIN: CPT | Performed by: INTERNAL MEDICINE

## 2021-10-20 RX ORDER — SERTRALINE HYDROCHLORIDE 25 MG/1
25 TABLET, FILM COATED ORAL DAILY
Qty: 30 TABLET | Refills: 5 | Status: SHIPPED | OUTPATIENT
Start: 2021-10-20 | End: 2021-11-24 | Stop reason: SDUPTHER

## 2021-10-20 NOTE — PROGRESS NOTES
Subjective Chief complaint is follow-up on panic attacks  Valeria Lemos is a 56 y.o. female.     History of Present Illness Valeria is here today for follow-up.  Her panic attacks have lessened.  She still has to use some lorazepam.  Her appetite has improved some and she is no longer losing weight.  We have tried to SSRI medications and she has not been able to tolerate them very well.  Also unfortunately we have not been able to get her into see a psychiatrist.  Her gynecologist has adjusted her estrogen replacement and that may be helping some.  She still feels like she could have a panic attack at any time and could not be on a plane while she had one.    The following portions of the patient's history were reviewed and updated as appropriate: allergies, current medications, past family history, past medical history, past social history, past surgical history and problem list.    Review of Systems   Psychiatric/Behavioral: The patient is nervous/anxious.        Objective   Physical Exam  Vitals and nursing note reviewed.   Constitutional:       Appearance: Normal appearance.   Cardiovascular:      Rate and Rhythm: Normal rate.   Pulmonary:      Effort: Pulmonary effort is normal.   Neurological:      General: No focal deficit present.      Mental Status: She is alert.   Psychiatric:         Attention and Perception: Attention normal.         Mood and Affect: Affect normal.         Speech: Speech normal.         Behavior: Behavior normal.           Assessment/Plan   Diagnoses and all orders for this visit:    1. Need for influenza vaccination (Primary)  -     FluLaval/Fluarix/Fluzone >6 Months    2. Anxiety, generalized    3. Panic attacks    Other orders  -     sertraline (Zoloft) 25 MG tablet; Take 1 tablet by mouth Daily.  Dispense: 30 tablet; Refill: 5    Valeria is here today for follow-up.  Unfortunately we have not been able to get her into see a psychiatrist.  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  I am going to try her on very low-dose sertraline.  It is metabolized in a different pathway from the previous medicines.  It will likely take approximately 3 weeks to fully get in her system.  I am going to keep her off work at least until November 10

## 2021-10-25 DIAGNOSIS — F41.0 ANXIETY ATTACK: ICD-10-CM

## 2021-10-25 DIAGNOSIS — R06.4 HYPERVENTILATION: ICD-10-CM

## 2021-10-25 RX ORDER — LORAZEPAM 0.5 MG/1
0.5 TABLET ORAL EVERY 8 HOURS PRN
Qty: 30 TABLET | Refills: 0 | Status: SHIPPED | OUTPATIENT
Start: 2021-10-25

## 2021-10-25 NOTE — TELEPHONE ENCOUNTER
Caller: Valeria Lemos    Relationship: Self      Requested Prescriptions:   Requested Prescriptions     Pending Prescriptions Disp Refills   • LORazepam (Ativan) 0.5 MG tablet 30 tablet 0     Sig: Take 1 tablet by mouth Every 8 (Eight) Hours As Needed for Anxiety.        Pharmacy where request should be sent:   [facility]/pharmacy #[id number] - [place], [place] - [address] AT [facility] - [phone] [facility] [phone]        Best call back number:[phone]    Does the patient have less than a 3 day supply:  [x] Yes  [] No    Jeison Ash Rep   10/25/21 08:45 EDT

## 2021-10-25 NOTE — TELEPHONE ENCOUNTER
Rx Refill Note  Requested Prescriptions     Pending Prescriptions Disp Refills   • LORazepam (Ativan) 0.5 MG tablet 30 tablet 0     Sig: Take 1 tablet by mouth Every 8 (Eight) Hours As Needed for Anxiety.      Last office visit with prescribing clinician: 10/20/2021      Next office visit with prescribing clinician: 11/3/2021            Eduardo Chapman MA  10/25/21, 08:47 EDT

## 2021-11-03 ENCOUNTER — OFFICE VISIT (OUTPATIENT)
Dept: FAMILY MEDICINE CLINIC | Facility: CLINIC | Age: 56
End: 2021-11-03

## 2021-11-03 VITALS
HEIGHT: 65 IN | DIASTOLIC BLOOD PRESSURE: 68 MMHG | SYSTOLIC BLOOD PRESSURE: 104 MMHG | WEIGHT: 110 LBS | BODY MASS INDEX: 18.33 KG/M2 | OXYGEN SATURATION: 99 % | HEART RATE: 70 BPM

## 2021-11-03 DIAGNOSIS — F41.1 ANXIETY, GENERALIZED: ICD-10-CM

## 2021-11-03 DIAGNOSIS — F41.0 ANXIETY ATTACK: Primary | ICD-10-CM

## 2021-11-03 PROCEDURE — 99214 OFFICE O/P EST MOD 30 MIN: CPT | Performed by: INTERNAL MEDICINE

## 2021-11-03 RX ORDER — BUSPIRONE HYDROCHLORIDE 5 MG/1
5 TABLET ORAL 2 TIMES DAILY
Qty: 60 TABLET | Refills: 5 | Status: SHIPPED | OUTPATIENT
Start: 2021-11-03 | End: 2021-12-01 | Stop reason: SDUPTHER

## 2021-11-03 NOTE — PROGRESS NOTES
Subjective Chief complaint is follow-up on anxiety  Valeria Lemos is a 56 y.o. female.     History of Present Illness Valeria is here today for follow-up.  She seems to be tolerating the Zoloft without the side effects that she had with the Lexapro and Paxil.  She is however feeling nervous mostly in the morning.  She will be shaky and tremulous.  She has been using a half of a lorazepam at that time which does seem to help..  Unfortunately if she is going to return to work she cannot be on any controlled substances.  We did discuss the possibility of adding BuSpar for anxiety.  It does require that she take it routinely.  Unfortunately we have still not been able to get her to see a psychiatrist.  She is on a waiting list at the [facility] but it does not look like that will happen anytime soon.    The following portions of the patient's history were reviewed and updated as appropriate: allergies, current medications, past family history, past medical history, past social history, past surgical history and problem list.    Review of Systems   Constitutional: Negative for chills and fever.   Respiratory: Negative for chest tightness and shortness of breath.    Neurological: Positive for tremors.   Psychiatric/Behavioral: The patient is nervous/anxious.        Objective   Physical Exam  Vitals and nursing note reviewed.   Constitutional:       Appearance: Normal appearance.   Cardiovascular:      Rate and Rhythm: Normal rate and regular rhythm.      Pulses: Normal pulses.   Pulmonary:      Effort: Pulmonary effort is normal.      Breath sounds: No wheezing, rhonchi or rales.   Neurological:      Mental Status: She is alert.   Psychiatric:         Attention and Perception: Attention normal.         Mood and Affect: Mood is anxious.         Speech: Speech normal.         Behavior: Behavior is cooperative.           Assessment/Plan   Diagnoses and all orders for this visit:    1. Anxiety attack (Primary)  -     Ambulatory  Referral to Psychiatry    2. Anxiety, generalized  -     Ambulatory Referral to Psychiatry    Other orders  -     busPIRone (BUSPAR) 5 MG tablet; Take 1 tablet by mouth 2 (Two) Times a Day.  Dispense: 60 tablet; Refill: 5      Valeria is here today for follow-up.  The Zoloft has helped some.  We are going to add some BuSpar for anxiety.  We may consider increasing BuSpar up to 10 mg twice daily or possibly increasing her Zoloft to 50 mg daily the longer she tolerates it.

## 2021-11-16 ENCOUNTER — TELEPHONE (OUTPATIENT)
Dept: FAMILY MEDICINE CLINIC | Facility: CLINIC | Age: 56
End: 2021-11-16

## 2021-11-16 NOTE — TELEPHONE ENCOUNTER
Called pt and left message on machine that Dr. Flores and his medical assistant is out today so I am certain about the paperwork and will send this to the medical assistant so she can look at this tomorrow when she gets in. Hub please inform patient

## 2021-11-16 NOTE — TELEPHONE ENCOUNTER
Caller: Valeria Lemos    Relationship: Self    Best call back number: [phone]      What was the call regarding: PATIENT IS CALLING TO CHECK STATUS ON FAX THAT WAS SUPPOSE TO GO TO [facility] FOR HER VISIT ON 11/03 AND HAVE HER OFF WORK TO 11/27 PER DR. BUCIO. PLEASE CALL AND ADVISE.     Do you require a callback: YES

## 2021-11-17 ENCOUNTER — OFFICE VISIT (OUTPATIENT)
Dept: FAMILY MEDICINE CLINIC | Facility: CLINIC | Age: 56
End: 2021-11-17

## 2021-11-17 VITALS
SYSTOLIC BLOOD PRESSURE: 108 MMHG | DIASTOLIC BLOOD PRESSURE: 60 MMHG | WEIGHT: 110 LBS | OXYGEN SATURATION: 98 % | HEIGHT: 65 IN | BODY MASS INDEX: 18.33 KG/M2 | HEART RATE: 77 BPM

## 2021-11-17 DIAGNOSIS — F41.0 PANIC ATTACKS: ICD-10-CM

## 2021-11-17 DIAGNOSIS — F41.1 ANXIETY, GENERALIZED: Primary | ICD-10-CM

## 2021-11-17 DIAGNOSIS — R25.2 SPASMS OF THE HANDS OR FEET: ICD-10-CM

## 2021-11-17 PROCEDURE — 99213 OFFICE O/P EST LOW 20 MIN: CPT | Performed by: INTERNAL MEDICINE

## 2021-11-17 RX ORDER — EFINACONAZOLE 100 MG/ML
SOLUTION TOPICAL
COMMUNITY
Start: 2021-11-16

## 2021-11-17 NOTE — PROGRESS NOTES
Subjective Chief complaint is follow-up on anxiety  Valeria Lemos is a 56 y.o. female.     History of Present Illness Valeria is here today for follow-up.  At her last visit we did start some BuSpar.  She reports that she had a little bit of trouble tolerating the 5 mg twice daily and has only been doing half a tablet twice daily.  She eventually was able to tolerate it and seems to think she may be doing better.  However she had an attack while she was in the parking lot at [facility] and had to talk herself down from that.  Does have an upcoming appointment with a psychologist in December.  Additionally she is complaining of clenching of her hands.  She is also getting some drawing in her feet.  She does report that she takes magnesium supplements for her tardive dyskinesia.  The following portions of the patient's history were reviewed and updated as appropriate: allergies, current medications, past family history, past medical history, past social history, past surgical history and problem list.    Review of Systems   Constitutional: Negative for chills and fever.   Respiratory: Negative for cough.    Psychiatric/Behavioral: The patient is nervous/anxious.        Objective   Physical Exam  Constitutional:       Appearance: Normal appearance.   Cardiovascular:      Rate and Rhythm: Normal rate.      Pulses: Normal pulses.   Pulmonary:      Effort: Pulmonary effort is normal.   Musculoskeletal:      Comments: I do not detect any abnormalities of the hands or wrists.   Neurological:      Mental Status: She is alert.           Assessment/Plan   Diagnoses and all orders for this visit:    1. Anxiety, generalized (Primary)    2. Panic attacks    3. Spasms of the hands or feet  -     Basic Metabolic Panel  -     Magnesium    Valeria is here today for follow-up.  She may have made some improvement.  I advised her to go to the full 5 mg tablet on the BuSpar.  We may need to eventually increase her sertraline.  I am going to check  her magnesium and potassium levels but do not have a good reason for her hand clenching.  I suspect this may be coming from some hyperventilation.

## 2021-11-18 LAB
BUN SERPL-MCNC: 9 MG/DL (ref 6–24)
BUN/CREAT SERPL: 10 (ref 9–23)
CALCIUM SERPL-MCNC: 8.8 MG/DL (ref 8.7–10.2)
CHLORIDE SERPL-SCNC: 99 MMOL/L (ref 96–106)
CO2 SERPL-SCNC: 27 MMOL/L (ref 20–29)
CREAT SERPL-MCNC: 0.87 MG/DL (ref 0.57–1)
GLUCOSE SERPL-MCNC: 90 MG/DL (ref 65–99)
MAGNESIUM SERPL-MCNC: 2.2 MG/DL (ref 1.6–2.3)
POTASSIUM SERPL-SCNC: 4.2 MMOL/L (ref 3.5–5.2)
SODIUM SERPL-SCNC: 140 MMOL/L (ref 134–144)

## 2021-11-24 ENCOUNTER — OFFICE VISIT (OUTPATIENT)
Dept: FAMILY MEDICINE CLINIC | Facility: CLINIC | Age: 56
End: 2021-11-24

## 2021-11-24 VITALS
SYSTOLIC BLOOD PRESSURE: 108 MMHG | DIASTOLIC BLOOD PRESSURE: 78 MMHG | HEIGHT: 65 IN | BODY MASS INDEX: 18.16 KG/M2 | WEIGHT: 109 LBS

## 2021-11-24 DIAGNOSIS — F41.1 ANXIETY, GENERALIZED: Primary | ICD-10-CM

## 2021-11-24 DIAGNOSIS — F41.0 PANIC ATTACKS: ICD-10-CM

## 2021-11-24 PROCEDURE — 99213 OFFICE O/P EST LOW 20 MIN: CPT | Performed by: INTERNAL MEDICINE

## 2021-11-24 RX ORDER — SERTRALINE HYDROCHLORIDE 25 MG/1
50 TABLET, FILM COATED ORAL DAILY
Qty: 60 TABLET | Refills: 5 | Status: SHIPPED | OUTPATIENT
Start: 2021-11-24 | End: 2022-01-19 | Stop reason: SDUPTHER

## 2021-11-24 NOTE — PROGRESS NOTES
Subjective  Chief complaint is follow-up on anxiety  Valeria Lemos is a 56 y.o. female.     History of Present Illness was is here today for follow-up on anxiety.  At her last visit we did have her increase to full 5 mg of BuSpar twice daily.  That seems to control her anxiety during the day.  She still has extreme anxiety when she first wakes up in the morning.  She does have an appointment with a psychiatrist on December 17.  She is recently seen a endocrinologist who does not think there is anything endocrinologic as a cause for her symptoms.  Her gynecologist is still trying to adjust her progesterone dose.  She still does not feel like she could return to work because of the unpredictability of his schedule.  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  She does not think she could handle that.    The following portions of the patient's history were reviewed and updated as appropriate: allergies, current medications, past family history, past medical history, past social history, past surgical history and problem list.    Review of Systems   Psychiatric/Behavioral: The patient is nervous/anxious.        Objective   Physical Exam  Constitutional:       Appearance: Normal appearance.   Neurological:      Mental Status: She is alert.   Psychiatric:         Attention and Perception: Attention normal.         Mood and Affect: Mood is anxious.         Speech: Speech normal.         Behavior: Behavior normal.         Thought Content: Thought content normal.           Assessment/Plan   Diagnoses and all orders for this visit:    1. Anxiety, generalized (Primary)    2. Panic attacks    Other orders  -     sertraline (Zoloft) 25 MG tablet; Take 2 tablets by mouth Daily.  Dispense: 60 tablet; Refill: 5    Valeria is here today for follow-up.  Her anxiety is still not controlled well enough to return to work.  I am going to have her slowly increase her sertraline up to 50 mg  daily.

## 2021-11-30 ENCOUNTER — TELEPHONE (OUTPATIENT)
Dept: FAMILY MEDICINE CLINIC | Facility: CLINIC | Age: 56
End: 2021-11-30

## 2021-12-01 RX ORDER — BUSPIRONE HYDROCHLORIDE 5 MG/1
5 TABLET ORAL 2 TIMES DAILY
Qty: 180 TABLET | Refills: 1 | Status: SHIPPED | OUTPATIENT
Start: 2021-12-01 | End: 2022-02-25

## 2021-12-06 ENCOUNTER — TELEPHONE (OUTPATIENT)
Dept: FAMILY MEDICINE CLINIC | Facility: CLINIC | Age: 56
End: 2021-12-06

## 2021-12-06 NOTE — TELEPHONE ENCOUNTER
Caller: Valeria Lemos    Relationship: Self    Best call back number: [phone]     What is the best time to reach you: ANY  Who are you requesting to speak with (clinical staff, provider,  specific staff member): CLINICAL    What was the call regarding: CURRENTLY OFF WORK AND NEEDS LETTER EXCUSING HER FROM [employer].    Do you require a callback: YES

## 2022-01-19 RX ORDER — SERTRALINE HYDROCHLORIDE 25 MG/1
50 TABLET, FILM COATED ORAL DAILY
Qty: 180 TABLET | Refills: 1 | Status: SHIPPED | OUTPATIENT
Start: 2022-01-19 | End: 2022-05-20

## 2022-02-25 ENCOUNTER — OFFICE VISIT (OUTPATIENT)
Dept: FAMILY MEDICINE CLINIC | Facility: CLINIC | Age: 57
End: 2022-02-25

## 2022-02-25 VITALS
HEART RATE: 92 BPM | OXYGEN SATURATION: 100 % | HEIGHT: 65 IN | DIASTOLIC BLOOD PRESSURE: 68 MMHG | BODY MASS INDEX: 18.66 KG/M2 | WEIGHT: 112 LBS | SYSTOLIC BLOOD PRESSURE: 108 MMHG

## 2022-02-25 DIAGNOSIS — M25.512 STERNOCLAVICULAR JOINT PAIN, LEFT: Primary | ICD-10-CM

## 2022-02-25 PROCEDURE — 99214 OFFICE O/P EST MOD 30 MIN: CPT | Performed by: INTERNAL MEDICINE

## 2022-02-25 RX ORDER — MELOXICAM 7.5 MG/1
7.5 TABLET ORAL DAILY
Qty: 15 TABLET | Refills: 0 | Status: SHIPPED | OUTPATIENT
Start: 2022-02-25

## 2022-02-25 RX ORDER — BUSPIRONE HYDROCHLORIDE 7.5 MG/1
7.5 TABLET ORAL 2 TIMES DAILY
COMMUNITY

## 2022-02-25 RX ORDER — DESVENLAFAXINE 25 MG/1
TABLET, EXTENDED RELEASE ORAL DAILY
COMMUNITY

## 2022-02-25 NOTE — PROGRESS NOTES
Subjective Chief complaint is swelling in the left collarbone  Valeria Lemos is a 56 y.o. female.     History of Present Illness Valeria is here today for swelling in her left collarbone.  This started approximately 5 to 6 days ago.  There was no known injury.  She did not do anything repetitive.  He noticed that there was a asymmetric swelling at the left sternoclavicular joint.  Not been any redness or warmth.  Additionally she is complaining of an earache on the left side.  Since her last visit she is now seeing a psychiatrist.  Her anxiety is a little bit better controlled on Pristiq 25 mg along with sertraline 50 mg and BuSpar 7.5 mg twice daily.    The following portions of the patient's history were reviewed and updated as appropriate: allergies, current medications, past family history, past medical history, past social history, past surgical history and problem list.    Review of Systems   Constitutional: Negative for chills and fever.   HENT: Positive for ear pain.    Musculoskeletal: Positive for joint swelling.       Objective   Physical Exam  Vitals and nursing note reviewed.   Neck:      Vascular: No carotid bruit.   Cardiovascular:      Rate and Rhythm: Normal rate and regular rhythm.   Pulmonary:      Effort: Pulmonary effort is normal.      Breath sounds: No wheezing, rhonchi or rales.   Musculoskeletal:      Comments: She has some swelling of the left sternoclavicular joint.  I do not detect any subluxation.   Lymphadenopathy:      Cervical: No cervical adenopathy.   Neurological:      Mental Status: She is alert.           Assessment/Plan   Diagnoses and all orders for this visit:    1. Sternoclavicular joint pain, left (Primary)  -     Sedimentation Rate  -     C-reactive Protein  -     XR Chest PA & Lateral; Future  -     XR clavicle left; Future    Other orders  -     meloxicam (MOBIC) 7.5 MG tablet; Take 1 tablet by mouth Daily.  Dispense: 15 tablet; Refill: 0    Valeria is here today for swelling  in her left sternoclavicular joint.  I am going to give her some meloxicam to take 1 daily for a couple of weeks.  She should have some food on her stomach when she does.  We are going to check a sedimentation rate and C-reactive protein I am going to get x-rays of her clavicle and chest.  Previous chest x-ray revealed some kyphoscoliosis which may be contributing some to some abnormal mechanics.

## 2022-02-26 ENCOUNTER — HOSPITAL ENCOUNTER (OUTPATIENT)
Dept: GENERAL RADIOLOGY | Facility: HOSPITAL | Age: 57
Discharge: HOME | End: 2022-02-26

## 2022-02-26 DIAGNOSIS — M25.512 STERNOCLAVICULAR JOINT PAIN, LEFT: ICD-10-CM

## 2022-02-26 LAB
CRP SERPL-MCNC: <1 MG/L (ref 0–10)
ERYTHROCYTE [SEDIMENTATION RATE] IN BLOOD BY WESTERGREN METHOD: 20 MM/HR (ref 0–40)

## 2022-02-26 PROCEDURE — 73000 X-RAY EXAM OF COLLAR BONE: CPT

## 2022-02-26 PROCEDURE — 71046 X-RAY EXAM CHEST 2 VIEWS: CPT

## 2022-05-20 RX ORDER — SERTRALINE HYDROCHLORIDE 25 MG/1
TABLET, FILM COATED ORAL
Qty: 90 TABLET | Refills: 1 | Status: SHIPPED | OUTPATIENT
Start: 2022-05-20

## 2023-07-25 ENCOUNTER — TELEPHONE (OUTPATIENT)
Dept: FAMILY MEDICINE CLINIC | Facility: CLINIC | Age: 58
End: 2023-07-25
Payer: COMMERCIAL

## 2023-07-25 NOTE — TELEPHONE ENCOUNTER
Caller: Valeria Lemos    Relationship to patient: Self    Best call back number: [phone]     Patient is needing: PATIENT WANTING TO CHECK STATUS OF REFERRAL FOR MRI FOR [facility], WOULD LIKE IT TO BE SENT TO [facility], WOULD LIKE REFERRAL TO GO TO [facility].       I informed the patient that her insurance considered this Workmen's Comp.

## 2023-10-16 ENCOUNTER — OFFICE VISIT (OUTPATIENT)
Dept: FAMILY MEDICINE CLINIC | Facility: CLINIC | Age: 58
End: 2023-10-16
Payer: COMMERCIAL

## 2023-10-16 VITALS
RESPIRATION RATE: 16 BRPM | TEMPERATURE: 99.1 F | HEIGHT: 65 IN | DIASTOLIC BLOOD PRESSURE: 78 MMHG | SYSTOLIC BLOOD PRESSURE: 122 MMHG | OXYGEN SATURATION: 98 % | WEIGHT: 116 LBS | BODY MASS INDEX: 19.33 KG/M2 | HEART RATE: 97 BPM

## 2023-10-16 DIAGNOSIS — J06.9 ACUTE URI: Primary | ICD-10-CM

## 2023-10-16 LAB
EXPIRATION DATE: NORMAL
FLUAV AG UPPER RESP QL IA.RAPID: NOT DETECTED
FLUBV AG UPPER RESP QL IA.RAPID: NOT DETECTED
INTERNAL CONTROL: NORMAL
Lab: NORMAL
SARS-COV-2 AG UPPER RESP QL IA.RAPID: NOT DETECTED

## 2023-10-16 PROCEDURE — 87428 SARSCOV & INF VIR A&B AG IA: CPT | Performed by: NURSE PRACTITIONER

## 2023-10-16 PROCEDURE — 99213 OFFICE O/P EST LOW 20 MIN: CPT | Performed by: NURSE PRACTITIONER

## 2023-10-16 RX ORDER — BENZONATATE 100 MG/1
100 CAPSULE ORAL 3 TIMES DAILY PRN
Qty: 30 CAPSULE | Refills: 0 | Status: SHIPPED | OUTPATIENT
Start: 2023-10-16

## 2023-10-16 RX ORDER — GUAIFENESIN 600 MG/1
1200 TABLET, EXTENDED RELEASE ORAL 2 TIMES DAILY
Qty: 40 TABLET | Refills: 0 | Status: SHIPPED | OUTPATIENT
Start: 2023-10-16

## 2023-10-16 RX ORDER — FLUTICASONE PROPIONATE 50 MCG
2 SPRAY, SUSPENSION (ML) NASAL DAILY
Qty: 16 G | Refills: 0 | Status: SHIPPED | OUTPATIENT
Start: 2023-10-16

## 2023-10-16 NOTE — PROGRESS NOTES
"Chief Complaint  Cough (Nasal drainage and phelgm...sore throat and muscle aches )    Subjective          Valeria Lemos presents to [facility] for  History of Present Illness  She is patient of Dr. Flores here with complaint of 7 days ago started with cough and coughing up phlegm, PND, tired and not able to sleep, congestion, runny nose, and occasional headache.  She sometimes has nausea due to phlegm production.  She denies fever, chills, SOB, chest pain, heart palpitations, facial pain/pressure, eye/ear pain/pressure.    Review of Systems   Constitutional:  Negative for chills and fever.        Tired   HENT:  Positive for congestion, postnasal drip and rhinorrhea. Negative for ear discharge, ear pain, sinus pressure, sneezing, sore throat and trouble swallowing.    Eyes:  Negative for pain, discharge and itching.   Respiratory:  Negative for shortness of breath.    Cardiovascular:  Negative for chest pain and palpitations.   Gastrointestinal:  Positive for nausea. Negative for vomiting.        Nauseous from phlegm   Neurological:  Positive for headaches.         Objective   Vital Signs:   /78 (BP Location: Right arm, Patient Position: Sitting, Cuff Size: Adult)   Pulse 97   Temp 99.1 °F (37.3 °C) (Oral)   Resp 16   Ht 165.1 cm (65\")   Wt 52.6 kg (116 lb)   SpO2 98%   BMI 19.30 kg/m²     BMI is within normal parameters. No other follow-up for BMI required.     Physical Exam  Vitals and nursing note reviewed.   Constitutional:       General: She is not in acute distress.     Appearance: Normal appearance. She is ill-appearing.   HENT:      Head: Normocephalic and atraumatic.      Salivary Glands: Right salivary gland is not diffusely enlarged or tender. Left salivary gland is not diffusely enlarged or tender.      Right Ear: Tympanic membrane normal. No middle ear effusion. There is no impacted cerumen. Tympanic membrane is not injected.      Left Ear: Tympanic membrane " normal.  No middle ear effusion. There is no impacted cerumen. Tympanic membrane is not injected.      Nose: Congestion present. No rhinorrhea.      Right Sinus: No maxillary sinus tenderness or frontal sinus tenderness.      Left Sinus: No maxillary sinus tenderness or frontal sinus tenderness.      Mouth/Throat:      Mouth: Mucous membranes are moist.      Tongue: No lesions. Tongue does not deviate from midline.      Palate: No mass and lesions.      Pharynx: Oropharynx is clear. Uvula midline. No oropharyngeal exudate or posterior oropharyngeal erythema.      Tonsils: No tonsillar exudate or tonsillar abscesses.   Eyes:      General:         Right eye: No discharge.         Left eye: No discharge.      Extraocular Movements: Extraocular movements intact.      Conjunctiva/sclera: Conjunctivae normal.      Pupils: Pupils are equal, round, and reactive to light.   Cardiovascular:      Rate and Rhythm: Normal rate and regular rhythm.      Heart sounds: Normal heart sounds. No murmur heard.  Pulmonary:      Effort: Pulmonary effort is normal. No respiratory distress.      Breath sounds: Normal breath sounds. No wheezing.   Musculoskeletal:      Cervical back: Neck supple. No tenderness.   Lymphadenopathy:      Cervical: No cervical adenopathy.   Skin:     General: Skin is warm and dry.      Findings: No erythema.   Neurological:      Mental Status: She is alert.          Result Review :                 Assessment and Plan    Diagnoses and all orders for this visit:    1. Acute URI (Primary)  -     dextromethorphan 15 MG/5ML syrup; Take 10 mL by mouth 3 (Three) Times a Day As Needed for Cough.  Dispense: 118 mL; Refill: 1  -     benzonatate (Tessalon Perles) 100 MG capsule; Take 1 capsule by mouth 3 (Three) Times a Day As Needed for Cough.  Dispense: 30 capsule; Refill: 0  -     fluticasone (FLONASE) 50 MCG/ACT nasal spray; 2 sprays into the nostril(s) as directed by provider Daily.  Dispense: 16 g; Refill: 0  -      guaiFENesin (Mucinex) 600 MG 12 hr tablet; Take 2 tablets by mouth 2 (Two) Times a Day.  Dispense: 40 tablet; Refill: 0    Take Tylenol (OTC) as directed, as needed, for pain, body aches, sore throat, headache & fever.  Take home Claritin daily.  Recommended Zofran for nausea and she declined.  Take Mucinex with 8oz water and increase water throughout the day.  Educated on proper way to use Flonase, take 2 sprays ea nostril daily until si/sy resolved.  Dextro/Tessalon as directed, as needed for cough.        Follow Up   Return if symptoms worsen or fail to improve.  Patient was given instructions and counseling regarding her condition or for health maintenance advice. Please see specific information pulled into the AVS if appropriate.

## 2023-10-27 ENCOUNTER — OFFICE VISIT (OUTPATIENT)
Dept: FAMILY MEDICINE CLINIC | Facility: CLINIC | Age: 58
End: 2023-10-27
Payer: COMMERCIAL

## 2023-10-27 ENCOUNTER — HOSPITAL ENCOUNTER (OUTPATIENT)
Dept: GENERAL RADIOLOGY | Facility: HOSPITAL | Age: 58
Discharge: HOME | End: 2023-10-27
Admitting: INTERNAL MEDICINE
Payer: COMMERCIAL

## 2023-10-27 VITALS
DIASTOLIC BLOOD PRESSURE: 70 MMHG | SYSTOLIC BLOOD PRESSURE: 100 MMHG | BODY MASS INDEX: 19.33 KG/M2 | WEIGHT: 116 LBS | OXYGEN SATURATION: 98 % | TEMPERATURE: 97.8 F | HEART RATE: 92 BPM | HEIGHT: 65 IN

## 2023-10-27 DIAGNOSIS — M54.50 ACUTE LEFT-SIDED LOW BACK PAIN WITHOUT SCIATICA: Primary | ICD-10-CM

## 2023-10-27 DIAGNOSIS — M54.50 ACUTE LEFT-SIDED LOW BACK PAIN WITHOUT SCIATICA: ICD-10-CM

## 2023-10-27 PROCEDURE — 72100 X-RAY EXAM L-S SPINE 2/3 VWS: CPT

## 2023-10-27 PROCEDURE — 99213 OFFICE O/P EST LOW 20 MIN: CPT | Performed by: INTERNAL MEDICINE

## 2023-10-27 RX ORDER — BUSPIRONE HYDROCHLORIDE 10 MG/1
5 TABLET ORAL 2 TIMES DAILY
COMMUNITY
Start: 2023-10-25

## 2023-10-27 NOTE — PROGRESS NOTES
Subjective chief complaint is back pain  Valeria Lemos is a 58 y.o. female.     History of Present Illness Mary is here today for some low back pain.  This is worse on the left than the right.  It seemed to come on after having a viral respiratory illness with prolonged coughing.  The cough seems to be a little bit better.  She had a negative COVID test a week ago.  She was treated symptomatically for that and ultimately is feeling better from the cough.  She has been off work because of the back pain however.  I did review a chest x-ray from a year or so ago.  She does have considerable thoracolumbar scoliosis.    The following portions of the patient's history were reviewed and updated as appropriate: allergies, current medications, and problem list.    Review of Systems   Constitutional:  Negative for chills and fever.   Respiratory:  Positive for cough. Negative for chest tightness.    Musculoskeletal:  Positive for back pain.   Neurological:  Negative for numbness.        She does report that she feels like she may be dragging her left leg somewhat.     Objective   Physical Exam  Vitals and nursing note reviewed.   Cardiovascular:      Rate and Rhythm: Normal rate and regular rhythm.   Pulmonary:      Effort: Pulmonary effort is normal.      Breath sounds: No wheezing, rhonchi or rales.   Musculoskeletal:      Comments: There is no point spinal tenderness.  She has some tenderness to the lumbar musculature on the left-hand side.  There is no sacroiliac joint tenderness.  SONALI testing is negative.  There may be some minimal pain down her leg with straight leg raising at approximately 45 degrees.       Assessment & Plan   Diagnoses and all orders for this visit:    1. Acute left-sided low back pain without sciatica (Primary)  -     XR Spine Lumbar 2 or 3 View; Future      Valeria is here today for some back pain.  I believe it is going to be more mechanical based on the curvature of her spine and the recent  coughing that she has done.  However with the curvature and her stature we are going to rule out the possibility of a compression fracture from the force of coughing.  She is going to get some plain x-rays.  In the interim we will just let her treat this symptomatically.  I did authorize a return to work with no lifting over 10 pounds.

## 2023-10-31 DIAGNOSIS — M25.512 STERNOCLAVICULAR JOINT PAIN, LEFT: Primary | ICD-10-CM

## 2023-10-31 RX ORDER — METHYLPREDNISOLONE 4 MG/1
TABLET ORAL
Qty: 21 TABLET | Refills: 0 | Status: SHIPPED | OUTPATIENT
Start: 2023-10-31 | End: 2023-10-31 | Stop reason: SDUPTHER

## 2023-10-31 RX ORDER — METHYLPREDNISOLONE 4 MG/1
TABLET ORAL
Qty: 21 TABLET | Refills: 0 | Status: SHIPPED | OUTPATIENT
Start: 2023-10-31

## 2023-10-31 NOTE — TELEPHONE ENCOUNTER
Patient calling to have pharmacy changed on medication.   
High blood cholesterol    Pancreatitis    POTS (postural orthostatic tachycardia syndrome)

## 2023-12-14 RX ORDER — BENZONATATE 100 MG/1
100 CAPSULE ORAL 3 TIMES DAILY
Qty: 30 CAPSULE | Refills: 1 | Status: SHIPPED | OUTPATIENT
Start: 2023-12-14

## 2023-12-14 RX ORDER — MONTELUKAST SODIUM 10 MG/1
10 TABLET ORAL NIGHTLY
Qty: 30 TABLET | Refills: 1 | Status: SHIPPED | OUTPATIENT
Start: 2023-12-14

## 2024-01-03 ENCOUNTER — OFFICE VISIT (OUTPATIENT)
Dept: FAMILY MEDICINE CLINIC | Facility: CLINIC | Age: 59
End: 2024-01-03
Payer: COMMERCIAL

## 2024-01-03 VITALS
OXYGEN SATURATION: 99 % | HEART RATE: 71 BPM | WEIGHT: 115 LBS | BODY MASS INDEX: 19.16 KG/M2 | RESPIRATION RATE: 16 BRPM | DIASTOLIC BLOOD PRESSURE: 72 MMHG | SYSTOLIC BLOOD PRESSURE: 98 MMHG | HEIGHT: 65 IN

## 2024-01-03 DIAGNOSIS — R05.8 POST-VIRAL COUGH SYNDROME: Primary | ICD-10-CM

## 2024-01-03 NOTE — PROGRESS NOTES
Answers submitted by the patient for this visit:  Primary Reason for Visit (Submitted on 1/3/2024)  What is the primary reason for your visit?: Cough  Subjective chief complaint is a cough  Valeria Lemos is a 58 y.o. female.     Cough  Associated symptoms include postnasal drip and rhinorrhea. Pertinent negatives include no chest pain, chills, ear pain, fever, myalgias, rash, sore throat, shortness of breath, weight loss or wheezing.  Valeria is here today for complaints of a cough.  She has had this since October.  She has been on numerous medications for it.  She then caught COVID.  That seems to be kind of waning but the cough is still present.  It is worse at night.  She is not short of breath.  She thinks that the generic Singulair may be upsetting her stomach some we advised her she could try stopping that.    The following portions of the patient's history were reviewed and updated as appropriate: allergies, current medications, and problem list.    Review of Systems   Constitutional:  Negative for chills, fever and unexpected weight loss.   HENT:  Positive for postnasal drip and rhinorrhea. Negative for ear pain and sore throat.    Respiratory:  Positive for cough. Negative for shortness of breath and wheezing.    Cardiovascular:  Negative for chest pain.   Musculoskeletal:  Negative for myalgias.   Skin:  Negative for rash.     Objective   Physical Exam  Vitals and nursing note reviewed.   HENT:      Right Ear: Tympanic membrane and ear canal normal.      Left Ear: Tympanic membrane and ear canal normal.      Nose: Rhinorrhea present. No congestion.      Mouth/Throat:      Pharynx: Oropharynx is clear. No posterior oropharyngeal erythema.   Cardiovascular:      Rate and Rhythm: Normal rate and regular rhythm.   Pulmonary:      Effort: Pulmonary effort is normal.      Breath sounds: No wheezing, rhonchi or rales.       Assessment & Plan   Diagnoses and all orders for this visit:    1. Post-viral cough syndrome  (Primary)    Other orders  -     Fluzone (or Fluarix & Flulaval for VFC) >6mos      Valeria is here today for cough.  Her exam is unremarkable and her oxygen level looks good.  I am going to just observe this for now.

## 2024-02-26 ENCOUNTER — OFFICE VISIT (OUTPATIENT)
Dept: FAMILY MEDICINE CLINIC | Facility: CLINIC | Age: 59
End: 2024-02-26
Payer: COMMERCIAL

## 2024-02-26 VITALS
RESPIRATION RATE: 16 BRPM | DIASTOLIC BLOOD PRESSURE: 58 MMHG | WEIGHT: 115 LBS | BODY MASS INDEX: 19.16 KG/M2 | TEMPERATURE: 98.1 F | HEIGHT: 65 IN | HEART RATE: 88 BPM | SYSTOLIC BLOOD PRESSURE: 96 MMHG | OXYGEN SATURATION: 98 %

## 2024-02-26 DIAGNOSIS — R05.3 CHRONIC COUGH: Primary | ICD-10-CM

## 2024-02-26 DIAGNOSIS — J39.2 THROAT IRRITATION: ICD-10-CM

## 2024-02-26 PROCEDURE — 99214 OFFICE O/P EST MOD 30 MIN: CPT | Performed by: INTERNAL MEDICINE

## 2024-02-26 RX ORDER — HYDROCODONE POLISTIREX AND CHLORPHENIRAMINE POLISTIREX 10; 8 MG/5ML; MG/5ML
5 SUSPENSION, EXTENDED RELEASE ORAL EVERY 12 HOURS PRN
Qty: 70 ML | Refills: 0 | Status: SHIPPED | OUTPATIENT
Start: 2024-02-26

## 2024-02-26 RX ORDER — METHYLPREDNISOLONE 4 MG/1
TABLET ORAL
Qty: 21 TABLET | Refills: 0 | Status: SHIPPED | OUTPATIENT
Start: 2024-02-26

## 2024-02-26 NOTE — PROGRESS NOTES
Subjective chief complaint is ongoing cough  Valeria Lemos is a 58 y.o. female.     History of Present Illness Valeria is here today for ongoing cough.  She has had this cough since October.  It is mostly a dry cough.  She has been using antihistamines now fairly steadily for months.  She is not having any reflux or heartburn.  She is not having fever or chills.  She is not short of breath.  She feels like there is an irritation in her throat.  Sometimes just turning her head will trigger a cough.  She lists an allergy to codeine but it was basically that it caused nausea and not any type of hives or angioedema.    The following portions of the patient's history were reviewed and updated as appropriate: allergies, current medications, and problem list.    Review of Systems   Constitutional:  Negative for chills.   HENT:  Positive for congestion and sore throat.    Respiratory:  Positive for cough.      Objective   Physical Exam  Vitals and nursing note reviewed.   Constitutional:       Appearance: Normal appearance.   HENT:      Right Ear: Tympanic membrane and ear canal normal.      Left Ear: Tympanic membrane and ear canal normal.      Nose: Congestion present.      Mouth/Throat:      Mouth: Mucous membranes are moist.      Pharynx: Oropharynx is clear. No oropharyngeal exudate or posterior oropharyngeal erythema.   Neck:      Comments: She does have some tenderness to the arytenoids.  Cardiovascular:      Rate and Rhythm: Normal rate and regular rhythm.   Pulmonary:      Effort: Pulmonary effort is normal.      Breath sounds: No wheezing, rhonchi or rales.   Lymphadenopathy:      Cervical: No cervical adenopathy.   Neurological:      Mental Status: She is alert.       Assessment & Plan   Diagnoses and all orders for this visit:    1. Chronic cough (Primary)  -     Hydrocod Jameson-Chlorphe Jameson ER (TUSSIONEX PENNKINETIC) 10-8 MG/5ML ER suspension; Take 5 mL by mouth Every 12 (Twelve) Hours As Needed for Cough.   Dispense: 70 mL; Refill: 0  -     Ambulatory Referral to ENT (Otolaryngology)    2. Throat irritation  -     Ambulatory Referral to ENT (Otolaryngology)    Other orders  -     methylPREDNISolone (MEDROL) 4 MG dose pack; Take as directed on package instructions.  Dispense: 21 tablet; Refill: 0    Valeria is here today for a cough that has been present now for approximately 4 to 5 months.  I am going to refer her to an otolaryngologist.  In the interim I am going to try 1 more round of the Medrol Dosepak.  I am going to let her try some Tussionex suspension to break the cycle of coughing.  We did discuss that it has hydrocodone in it.  However the amount is very small.  She only had nausea with codeine.  I was not a true allergic reaction.  However if she does not tolerate it she can let me know.  She did sign the appropriate controlled substance agreement.